# Patient Record
Sex: FEMALE | Race: WHITE | Employment: STUDENT | URBAN - METROPOLITAN AREA
[De-identification: names, ages, dates, MRNs, and addresses within clinical notes are randomized per-mention and may not be internally consistent; named-entity substitution may affect disease eponyms.]

---
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
## 2024-07-17 ENCOUNTER — OFFICE VISIT (OUTPATIENT)
Dept: FAMILY MEDICINE CLINIC | Facility: CLINIC | Age: 19
End: 2024-07-17
Payer: COMMERCIAL

## 2024-07-17 ENCOUNTER — TELEPHONE (OUTPATIENT)
Dept: FAMILY MEDICINE CLINIC | Facility: CLINIC | Age: 19
End: 2024-07-17

## 2024-07-17 VITALS
TEMPERATURE: 97 F | DIASTOLIC BLOOD PRESSURE: 77 MMHG | HEIGHT: 65 IN | RESPIRATION RATE: 16 BRPM | BODY MASS INDEX: 26.33 KG/M2 | SYSTOLIC BLOOD PRESSURE: 113 MMHG | WEIGHT: 158 LBS | HEART RATE: 96 BPM

## 2024-07-17 DIAGNOSIS — Z00.00 LABORATORY EXAMINATION ORDERED AS PART OF A ROUTINE GENERAL MEDICAL EXAMINATION: ICD-10-CM

## 2024-07-17 DIAGNOSIS — Z13.89 SCREENING FOR GENITOURINARY CONDITION: ICD-10-CM

## 2024-07-17 DIAGNOSIS — Z00.00 PHYSICAL EXAM, ANNUAL: Primary | ICD-10-CM

## 2024-07-17 PROCEDURE — 99385 PREV VISIT NEW AGE 18-39: CPT | Performed by: FAMILY MEDICINE

## 2024-07-17 RX ORDER — ALPRAZOLAM 0.25 MG/1
0.25 TABLET ORAL
COMMUNITY
Start: 2024-05-17 | End: 2024-07-17

## 2024-07-17 RX ORDER — FLUOXETINE HYDROCHLORIDE 20 MG/1
20 CAPSULE ORAL EVERY MORNING
COMMUNITY
Start: 2024-07-14

## 2024-07-17 NOTE — TELEPHONE ENCOUNTER
Patient's mother called inquiring re william's annual physical and why PAP smear was not done today. Dr. Soto requested that I call the patient and explain the \"why\" behind it. Spoke with patient's mother, in HIPAA form.Explained recommendation of [employer] Ob[employer] that PAP smear is performed at age 21 and above. She understood. Advised the patient's mother to call their insurance regarding coverage for PAP smear before age 21. Patient's mother said she will.

## 2024-07-17 NOTE — PATIENT INSTRUCTIONS
Healthy diet.  Stay active.   Return for fasting blood work.  You can call [phone] to have it scheduled.

## 2024-07-17 NOTE — PROGRESS NOTES
HPI:   Missy Stuart is a 18 year old female who presents for a complete physical exam. Symptoms: denies discharge, itching, burning or dysuria.  She is new patient today here to establish care.  Patient had left knee surgery for ACL tear after a skiing accident.  Patient is taking fluoxetine prescribed by Dr. Judy Sky psychiatrist for anxiety doing well.  Patient has continued to hearing loss which is stable right now moderate to severe seeing Estrellita Reyes.  Patient is going to school in [place].    Immunization History   Administered Date(s) Administered    Covid-19 Vaccine Pfizer 30 mcg/0.3 ml 04/07/2021, 05/01/2021, 01/05/2022    DTAP 10/18/2004, 12/10/2004, 02/07/2005, 11/07/2005, 09/02/2008    FLU VAC QIV SPLIT 3 YRS AND OLDER (23283) 08/30/2016, 09/28/2017, 09/18/2019    FLUMIST Intranasal Influenza 09/24/2013, 10/07/2014    FLUZONE 6 months and older PFS 0.5 ml (12946) 11/28/2018, 02/10/2021    HEP A 08/07/2006, 12/19/2007    HEP B/HIB 10/18/2004, 12/10/2004, 08/09/2005    Hpv Virus Vaccine 9 Dorinda Im 07/28/2015, 03/22/2016, 08/30/2016    IPV 10/18/2004, 12/10/2004, 11/07/2005, 09/02/2008    Influenza 12/19/2007, 09/01/2011, 10/23/2012    Influenza Virus Vaccine, H1N1 12/12/2009, 02/25/2010    MMR 08/09/2005, 08/10/2009    Meningococcal-Menactra 07/28/2015, 02/10/2021    Pneumococcal (Prevnar 7) 10/18/2004, 12/10/2004, 02/07/2005, 08/09/2005    TDAP 07/28/2015    Varicella 11/07/2005, 08/10/2009   Deferred Date(s) Deferred    Influenza Vaccine Refused 03/02/2022      Wt Readings from Last 6 Encounters:   07/17/24 158 lb (71.7 kg) (86%, Z= 1.07)*   06/20/23 140 lb (63.5 kg) (72%, Z= 0.59)*   11/25/22 140 lb (63.5 kg) (74%, Z= 0.65)*   06/07/22 145 lb (65.8 kg) (81%, Z= 0.87)*   04/15/22 145 lb (65.8 kg) (81%, Z= 0.88)*   03/24/22 145 lb (65.8 kg) (81%, Z= 0.88)*     * Growth percentiles are based on CDC (Girls, 2-20 Years) data.     Body mass index is 26.29 kg/m².     Total Cholesterol (mg/dL)    Date Value   07/28/2015 203     Cholesterol (mg/dL)   Date Value   09/28/2017 186 (H)     HDL Cholesterol (mg/dL)   Date Value   07/28/2015 41     Direct HDL (mg/dL)   Date Value   09/28/2017 65     LDL Cholesterol (mg/dL)   Date Value   07/28/2015 84     Calculated LDL (mg/dL)   Date Value   09/28/2017 89     AST (SGOT) (IU/L)   Date Value   04/26/2010 41     AST (U/L)   Date Value   09/28/2017 17     ALT (SGPT) (IU/L)   Date Value   04/26/2010 21     ALT (U/L)   Date Value   09/28/2017 15      Glucose   Date Value Ref Range Status   04/26/2010 91 65 - 99 mg/dL Final        Current Outpatient Medications   Medication Sig Dispense Refill    FLUoxetine 20 MG Oral Cap Take 1 capsule (20 mg total) by mouth every morning.        Past Medical History:    Hearing aid worn      Past Surgical History:   Procedure Laterality Date    Knee surgery Left 06/05/2024    ACL      Family History   Problem Relation Age of Onset    Other (Other) Mother         thyroid    Other (RA) Maternal Grandmother     Hypertension Maternal Grandmother     Hypertension Maternal Grandfather     Lipids Maternal Grandfather     Heart Disorder Paternal Grandfather     Lipids Paternal Grandfather       Social History:   Social History     Socioeconomic History    Marital status: Single   Tobacco Use    Smoking status: Never    Smokeless tobacco: Never   Vaping Use    Vaping status: Never Used   Substance and Sexual Activity    Alcohol use: No    Drug use: No    Sexual activity: Never     Occ: student. : no Children: none  Exercise: three times per week.  Diet: watches calories closely     REVIEW OF SYSTEMS:   GENERAL: feels well otherwise  SKIN: denies any unusual skin lesions  EYES:denies blurred vision or double vision  HEENT: denies nasal congestion, sinus pain or ST  LUNGS: denies shortness of breath with exertion  CARDIOVASCULAR: denies chest pain on exertion  GI: denies abdominal pain,denies heartburn  : denies dysuria, vaginal  discharge or itching,periods regular   MUSCULOSKELETAL: denies back pain  NEURO: denies headaches  PSYCHE: denies depression or anxiety  HEMATOLOGIC: denies hx of anemia  ENDOCRINE: denies thyroid history  ALL/ASTHMA: denies hx of allergy or asthma    EXAM:   /77 (BP Location: Left arm, Patient Position: Sitting, Cuff Size: adult)   Pulse 96   Temp 97.3 °F (36.3 °C) (Temporal)   Resp 16   Ht 5' 5\" (1.651 m)   Wt 158 lb (71.7 kg)   LMP 07/10/2024 (Exact Date)   BMI 26.29 kg/m²   Body mass index is 26.29 kg/m².   GENERAL: well developed, well nourished,in no apparent distress  SKIN: no rashes,no suspicious lesions  HEENT: atraumatic, normocephalic,ears and throat are clear  EYES:PERRLA, EOMI, normal optic disk,conjunctiva are clear  NECK: supple,no adenopathy,no bruits  CHEST: no chest tenderness  BREAST: no dominant or suspicious mass  LUNGS: clear to auscultation  CARDIO: RRR without murmur  GI: good BS's,no masses, HSM or tenderness  :introitus is normal,scant discharge,cervix is pink,no adnexal masses or tenderness, PAP was done   RECTAL:good rectal tone, stool is OB negative  MUSCULOSKELETAL: back is not tender,FROM of the back  EXTREMITIES: no cyanosis, clubbing or edema  NEURO: Oriented times three,cranial nerves are intact,motor and sensory are grossly intact    ASSESSMENT AND PLAN:   Missy Stuart is a 18 year old female who presents for a complete physical exam.  Encounter Diagnoses   Name Primary?    Physical exam, annual Yes    Laboratory examination ordered as part of a routine general medical examination     Screening for genitourinary condition        Orders Placed This Encounter   Procedures    CBC With Differential With Platelet    Comp Metabolic Panel (14)    Lipid Panel    Urinalysis with Culture Reflex    TSH W Reflex To Free T4       Meds & Refills for this Visit:  Requested Prescriptions      No prescriptions requested or ordered in this encounter   Healthy diet.  Stay active.    Fasting blood work.    Imaging & Consults:  None    Pap and pelvic deferred.  Self breast exam explained. Health maintenance, will check fasting Lipids, CMP, and CBC. Pt be at 44 referred for screening colonoscopy. Pt' s weight is Body mass index is 26.29 kg/m²., recommended low carb diet and aerobic exercise 30 minutes three times weekly.  The patient indicates understanding of these issues and agrees to the plan.  The patient is asked to return for CPX in 1 year follow-up sooner depending on the test results.

## 2024-08-14 ENCOUNTER — PATIENT MESSAGE (OUTPATIENT)
Facility: CLINIC | Age: 19
End: 2024-08-14

## 2024-08-14 ENCOUNTER — HOSPITAL ENCOUNTER (OUTPATIENT)
Dept: CT IMAGING | Facility: HOSPITAL | Age: 19
Discharge: HOME | End: 2024-08-14
Attending: EMERGENCY MEDICINE
Payer: COMMERCIAL

## 2024-08-14 ENCOUNTER — OFFICE VISIT (OUTPATIENT)
Facility: CLINIC | Age: 19
End: 2024-08-14
Payer: COMMERCIAL

## 2024-08-14 ENCOUNTER — LAB ENCOUNTER (OUTPATIENT)
Dept: LAB | Age: 19
End: 2024-08-14
Attending: Other
Payer: COMMERCIAL

## 2024-08-14 ENCOUNTER — HOSPITAL ENCOUNTER (EMERGENCY)
Facility: HOSPITAL | Age: 19
Discharge: HOME | End: 2024-08-14
Attending: EMERGENCY MEDICINE
Payer: COMMERCIAL

## 2024-08-14 VITALS
DIASTOLIC BLOOD PRESSURE: 79 MMHG | HEART RATE: 84 BPM | OXYGEN SATURATION: 99 % | SYSTOLIC BLOOD PRESSURE: 115 MMHG | RESPIRATION RATE: 16 BRPM

## 2024-08-14 VITALS — RESPIRATION RATE: 16 BRPM | HEART RATE: 86 BPM | SYSTOLIC BLOOD PRESSURE: 118 MMHG | DIASTOLIC BLOOD PRESSURE: 72 MMHG

## 2024-08-14 DIAGNOSIS — G40.109 FOCAL EPILEPSY (HCC): Primary | ICD-10-CM

## 2024-08-14 DIAGNOSIS — R79.89 LOW SERUM SODIUM: ICD-10-CM

## 2024-08-14 DIAGNOSIS — D72.829 LEUKOCYTOSIS, UNSPECIFIED TYPE: Primary | ICD-10-CM

## 2024-08-14 DIAGNOSIS — Z00.00 PHYSICAL EXAM, ANNUAL: ICD-10-CM

## 2024-08-14 DIAGNOSIS — Z79.899 ENCOUNTER FOR LONG-TERM (CURRENT) DRUG USE: ICD-10-CM

## 2024-08-14 DIAGNOSIS — R56.9 SEIZURE (HCC): Primary | ICD-10-CM

## 2024-08-14 DIAGNOSIS — E87.6 LOW SERUM POTASSIUM: Primary | ICD-10-CM

## 2024-08-14 DIAGNOSIS — Z13.89 SCREENING FOR GENITOURINARY CONDITION: ICD-10-CM

## 2024-08-14 LAB
ALBUMIN SERPL-MCNC: 5.1 G/DL (ref 3.2–4.8)
ALBUMIN/GLOB SERPL: 2 {RATIO} (ref 1–2)
ALP LIVER SERPL-CCNC: 106 U/L
ALT SERPL-CCNC: 22 U/L
ANION GAP SERPL CALC-SCNC: 16.5 MMOL/L (ref 0–18)
ANION GAP SERPL CALC-SCNC: 9 MMOL/L (ref 0–18)
AST SERPL-CCNC: 31 U/L (ref ?–34)
ATRIAL RATE: 109 BPM
B-HCG SERPL-ACNC: <1 MIU/ML
BASOPHILS # BLD AUTO: 0.04 X10(3) UL (ref 0–0.2)
BASOPHILS # BLD AUTO: 0.1 X10(3) UL (ref 0–0.2)
BASOPHILS NFR BLD AUTO: 0.4 %
BASOPHILS NFR BLD AUTO: 0.5 %
BILIRUB SERPL-MCNC: 0.6 MG/DL (ref 0.3–1.2)
BUN BLD-MCNC: 11 MG/DL (ref 9–23)
BUN BLD-MCNC: 13 MG/DL (ref 9–23)
BUN/CREAT SERPL: 10.7 (ref 10–20)
CALCIUM BLD-MCNC: 9.8 MG/DL (ref 8.7–10.4)
CALCIUM BLD-MCNC: 9.8 MG/DL (ref 8.7–10.4)
CHLORIDE SERPL-SCNC: 102 MMOL/L (ref 98–112)
CHLORIDE SERPL-SCNC: 107 MMOL/L (ref 98–112)
CHOLEST SERPL-MCNC: 229 MG/DL (ref ?–200)
CO2 SERPL-SCNC: 14.5 MMOL/L (ref 21–32)
CO2 SERPL-SCNC: 22 MMOL/L (ref 21–32)
CREAT BLD-MCNC: 0.95 MG/DL
CREAT BLD-MCNC: 1.03 MG/DL
DEPRECATED RDW RBC AUTO: 41 FL (ref 35.1–46.3)
EGFRCR SERPLBLD CKD-EPI 2021: 80 ML/MIN/1.73M2 (ref 60–?)
EGFRCR SERPLBLD CKD-EPI 2021: 88 ML/MIN/1.73M2 (ref 60–?)
EOSINOPHIL # BLD AUTO: 0.3 X10(3) UL (ref 0–0.7)
EOSINOPHIL # BLD AUTO: 0.68 X10(3) UL (ref 0–0.7)
EOSINOPHIL NFR BLD AUTO: 2.7 %
EOSINOPHIL NFR BLD AUTO: 3.7 %
ERYTHROCYTE [DISTWIDTH] IN BLOOD BY AUTOMATED COUNT: 12.5 %
ERYTHROCYTE [DISTWIDTH] IN BLOOD BY AUTOMATED COUNT: 12.6 % (ref 11–15)
FASTING STATUS PATIENT QL REPORTED: NO
GLOBULIN PLAS-MCNC: 2.6 G/DL (ref 2–3.5)
GLUCOSE BLD-MCNC: 202 MG/DL (ref 70–99)
GLUCOSE BLD-MCNC: 89 MG/DL (ref 70–99)
HCT VFR BLD AUTO: 40.6 %
HCT VFR BLD AUTO: 41 %
HDLC SERPL-MCNC: 87 MG/DL (ref 40–59)
HGB BLD-MCNC: 13.7 G/DL
HGB BLD-MCNC: 13.9 G/DL
IMM GRANULOCYTES # BLD AUTO: 0.06 X10(3) UL (ref 0–1)
IMM GRANULOCYTES # BLD AUTO: 0.16 X10(3) UL (ref 0–1)
IMM GRANULOCYTES NFR BLD: 0.5 %
IMM GRANULOCYTES NFR BLD: 0.9 %
LDLC SERPL CALC-MCNC: 118 MG/DL (ref ?–100)
LYMPHOCYTES # BLD AUTO: 1.4 X10(3) UL (ref 1–4)
LYMPHOCYTES # BLD AUTO: 4.44 X10(3) UL (ref 1–4)
LYMPHOCYTES NFR BLD AUTO: 12.4 %
LYMPHOCYTES NFR BLD AUTO: 24 %
MCH RBC QN AUTO: 29.5 PG (ref 26–34)
MCH RBC QN AUTO: 29.8 PG (ref 26–34)
MCHC RBC AUTO-ENTMCNC: 33.4 G/DL (ref 31–37)
MCHC RBC AUTO-ENTMCNC: 34.2 G/DL (ref 31–37)
MCV RBC AUTO: 87.1 FL
MCV RBC AUTO: 88.4 FL
MONOCYTES # BLD AUTO: 1.37 X10(3) UL (ref 0.1–1)
MONOCYTES # BLD AUTO: 1.57 X10(3) UL (ref 0.1–1)
MONOCYTES NFR BLD AUTO: 12.1 %
MONOCYTES NFR BLD AUTO: 8.5 %
NEUTROPHILS # BLD AUTO: 11.58 X10 (3) UL (ref 1.5–7.7)
NEUTROPHILS # BLD AUTO: 11.58 X10(3) UL (ref 1.5–7.7)
NEUTROPHILS # BLD AUTO: 8.15 X10 (3) UL (ref 1.5–7.7)
NEUTROPHILS # BLD AUTO: 8.15 X10(3) UL (ref 1.5–7.7)
NEUTROPHILS NFR BLD AUTO: 62.4 %
NEUTROPHILS NFR BLD AUTO: 71.9 %
NONHDLC SERPL-MCNC: 142 MG/DL (ref ?–130)
OSMOLALITY SERPL CALC.SUM OF ELEC: 282 MOSM/KG (ref 275–295)
OSMOLALITY SERPL CALC.SUM OF ELEC: 285 MOSM/KG (ref 275–295)
P AXIS: 47 DEGREES
P-R INTERVAL: 152 MS
PLATELET # BLD AUTO: 293 10(3)UL (ref 150–450)
PLATELET # BLD AUTO: 393 10(3)UL (ref 150–450)
PLATELETS.RETICULATED NFR BLD AUTO: 1.3 % (ref 0–7)
POTASSIUM SERPL-SCNC: 3.2 MMOL/L (ref 3.5–5.1)
POTASSIUM SERPL-SCNC: 3.7 MMOL/L (ref 3.5–5.1)
PROT SERPL-MCNC: 7.7 G/DL (ref 5.7–8.2)
Q-T INTERVAL: 358 MS
QRS DURATION: 86 MS
QTC CALCULATION (BEZET): 482 MS
R AXIS: 45 DEGREES
RBC # BLD AUTO: 4.64 X10(6)UL
RBC # BLD AUTO: 4.66 X10(6)UL
SODIUM SERPL-SCNC: 133 MMOL/L (ref 136–145)
SODIUM SERPL-SCNC: 138 MMOL/L (ref 136–145)
T AXIS: 45 DEGREES
TRIGL SERPL-MCNC: 139 MG/DL (ref 30–149)
TSI SER-ACNC: 1.93 MIU/ML (ref 0.55–4.78)
VENTRICULAR RATE: 109 BPM
VLDLC SERPL CALC-MCNC: 24 MG/DL (ref 0–30)
WBC # BLD AUTO: 11.3 X10(3) UL (ref 4–11)
WBC # BLD AUTO: 18.5 X10(3) UL (ref 4–11)

## 2024-08-14 PROCEDURE — 99285 EMERGENCY DEPT VISIT HI MDM: CPT

## 2024-08-14 PROCEDURE — 36415 COLL VENOUS BLD VENIPUNCTURE: CPT

## 2024-08-14 PROCEDURE — 80053 COMPREHEN METABOLIC PANEL: CPT

## 2024-08-14 PROCEDURE — 99284 EMERGENCY DEPT VISIT MOD MDM: CPT

## 2024-08-14 PROCEDURE — 85025 COMPLETE CBC W/AUTO DIFF WBC: CPT

## 2024-08-14 PROCEDURE — 93005 ELECTROCARDIOGRAM TRACING: CPT

## 2024-08-14 PROCEDURE — 84702 CHORIONIC GONADOTROPIN TEST: CPT | Performed by: EMERGENCY MEDICINE

## 2024-08-14 PROCEDURE — 84443 ASSAY THYROID STIM HORMONE: CPT

## 2024-08-14 PROCEDURE — 80061 LIPID PANEL: CPT

## 2024-08-14 PROCEDURE — 70450 CT HEAD/BRAIN W/O DYE: CPT | Performed by: EMERGENCY MEDICINE

## 2024-08-14 PROCEDURE — 99205 OFFICE O/P NEW HI 60 MIN: CPT | Performed by: OTHER

## 2024-08-14 PROCEDURE — 80048 BASIC METABOLIC PNL TOTAL CA: CPT

## 2024-08-14 RX ORDER — MIRTAZAPINE 7.5 MG/1
7.5 TABLET, FILM COATED ORAL NIGHTLY
COMMUNITY

## 2024-08-14 RX ORDER — MIDAZOLAM 5 MG/.1ML
5 SPRAY NASAL AS NEEDED
Qty: 1 EACH | Refills: 0 | Status: SHIPPED | OUTPATIENT
Start: 2024-08-14

## 2024-08-14 RX ORDER — ACETAMINOPHEN 500 MG
1000 TABLET ORAL ONCE
Status: COMPLETED | OUTPATIENT
Start: 2024-08-14 | End: 2024-08-14

## 2024-08-14 RX ORDER — OXCARBAZEPINE 150 MG/1
TABLET, FILM COATED ORAL
Qty: 180 TABLET | Refills: 11 | Status: SHIPPED | OUTPATIENT
Start: 2024-08-14

## 2024-08-14 RX ORDER — CLONAZEPAM 0.25 MG/1
0.25 TABLET, ORALLY DISINTEGRATING ORAL 2 TIMES DAILY
Qty: 10 TABLET | Refills: 0 | Status: SHIPPED | OUTPATIENT
Start: 2024-08-14 | End: 2024-08-19

## 2024-08-14 NOTE — ED PROVIDER NOTES
Patient Seen in: [facility] Emergency Department      History   No chief complaint on file.    Stated Complaint: SEIZURE    Subjective:   HPI    19-year-old female presenting with a seizure.  Patient first-time seizure.  Approximately 5 minutes with a prolonged postictal state that the EMS arrived gave Versed 2.  Patient has had no recent illness cough cold runny nose recently has been without her fluoxetine for anxiety she has been taking for  She denies any other complaints.    Objective:   No pertinent past medical history.            No pertinent past surgical history.              No pertinent social history.            Review of Systems    Positive for stated Chief Complaint: No chief complaint on file.    Other systems are as noted in HPI.  Constitutional and vital signs reviewed.      All other systems reviewed and negative except as noted above.    Physical Exam     ED Triage Vitals   BP    Pulse    Resp    Temp    Temp src    SpO2    O2 Device        Current Vitals:   No data recorded        Physical Exam    Patient sleeping small abrasion noted to the patient's son otherwise HEENT exam is normal lungs are clear cardiovascular exam regular rhythm abdomen soft nontender extremities no Cyanosis or edema no rash    ED Course     Labs Reviewed   CBC WITH DIFFERENTIAL WITH PLATELET - Abnormal; Notable for the following components:       Result Value    WBC 18.5 (*)     Neutrophil Absolute Prelim 11.58 (*)     Neutrophil Absolute 11.58 (*)     Lymphocyte Absolute 4.44 (*)     Monocyte Absolute 1.57 (*)     All other components within normal limits   COMP METABOLIC PANEL (14)   LIPID PANEL   TSH W REFLEX TO FREE T4   URINALYSIS WITH CULTURE REFLEX             Differential diagnosis includes status epilepticus, seizure         MDM                                         Medical Decision Making  19-year-old female presenting with seizure.  IV established cardiac monitor shows a sinus rhythm pulse ox shows no  signs of hypoxia CBC shows an elevated white cell count metabolic panel shows a slightly elevated glucose level but could be related to seizure independent interpretation by ED physician of head CT shows no acute hemorrhage patient is awake alert and appropriate.  Emerged part will be discharged home has Prozac at home to use is to follow-up with neurology is to stop driving until cleared by neurology and is to return to the emergency department for worsening symptoms other complaints  The patient was screened and evaluated during this visit.  As a treating physician attending to the patient, I determined, within reasonable clinical confidence and prior to discharge, that an emergency medical condition was not or was no longer present.  There was no indication for further evaluation, treatment or admission on an emergency basis.    The usual and customary discharge instructions were discussed given the patient's ER course.  We discussed signs and symptoms that should prompt the patient's immediate return to the emergency department.  Reasonable over-the-counter and prescription treatment options and physician follow-up plan was discussed.  Patient was discharged home in good condition  This note was prepared using Dragon Medical voice recognition dictation software.  As a result errors may occur.  When identified to these areas have been corrected.  While every attempt is made to correct errors during dictation discrepancies may still exist.  Please contact if there are any errors    Amount and/or Complexity of Data Reviewed  Labs: ordered. Decision-making details documented in ED Course.  Radiology: ordered and independent interpretation performed. Decision-making details documented in ED Course.  ECG/medicine tests: ordered and independent interpretation performed. Decision-making details documented in ED Course.        Disposition and Plan     Clinical Impression:  1. Physical exam, annual    2. Screening for  genitourinary condition         Disposition:  There is no disposition on file for this visit.  There is no disposition time on file for this visit.    Follow-up:  No follow-up provider specified.        Medications Prescribed:  Current Discharge Medication List

## 2024-08-14 NOTE — PROGRESS NOTES
Neurology History & Physical     ASSESSMENT & PLAN:      ICD-10-CM    1. Focal epilepsy (HCC)  G40.109 EEG     MRI BRAIN (W+WO) (CPT=70553)     Midazolam (NAYZILAM) 5 MG/0.1ML Nasal Solution      2. Encounter for long-term (current) drug use  Z79.899 Basic Metabolic Panel (8)     CBC With Differential With Platelet        New onset epilepsy, with focal seizures.  She had daily greer vu leading up to the GTC seizure today, and thus needs to be on medication immediately.  This poses threat to bodily function.  Obtain MRI brain and EEG.  Start OXC titration (avoiding LEV given psych history), but first check CBC and BMP prior to starting OXC, given they were abnormal in ER.  Another option would be LCM.  I don't think her psych meds are related to the seizures.    She was concerned about her EKG and worsening of her left knee pain after the seizure, I advised she d/w PCP.    Counseling was provided: until seizure-free / event-free for 6 consecutive months, the patient must take precautions, including but not limited to absolutely avoid driving; avoid the use of heavy machinery; avoid alcohol and drug use; to take usual precautions, including but not limited to swimming only with supervision, avoiding tub baths, using only the back burners on the stove, avoiding open flames/heights/rooftops; taking precautions with children and infants; and to be aware of risks and benefits of treatment, including but not limited to medication side-effects, including possibly life-threatening side effects and conditions.    We discussed seizure rescue plan, and I provided Nayzilam Rx, to be given if shaking lasts > 5 minutes.  I recommended to call ambulance if: shaking lasts > 5 minutes, if there is a reduction or cessation of breathing, the patient turns blue, or any other concern out of the ordinary for the patient's seizures.  I advised that EMS can also give rescue medication, and every seizure does not require ER visit, especially  if it is brief or within the usual pattern for the patient's seizures.    Return in about 6 weeks (around 9/25/2024).       ~~~~~~~~~~~~~~~~~~~~~~~~~~~    CHIEF COMPLAINT / REASON FOR VISIT:    Chief Complaint   Patient presents with    Seizures     Was in ER this AM. Had auras a week leading up to episode (greer vu, feeling hot, numbness in hands). Also had episode of leg shaking 2 weeks ago. Medication changes recently. Accompanied by mother & father     HISTORY OBTAINED FROM:  Patient and others as above  Chart review    HISTORY:  Missy Stuart is a 19 year old female with new events of very intense greer vu for the past week.  There is BUE numbness and sweaty, increasing frequency each day (had 7 yesterday).  Today at 1 am, she was with her mom and brother, and had another greer vu feeling, more intense and prolonged.  She had a bad taste in her mouth.  She felt the numbness, was washing her hands and face due to symptoms.  She could not get her words out, and then she began to spin in a [place].  Her mom hugged her, her head jerked upward and she had a generalized convulsion, foaming at the mouth.  EMS was called.  Shaking stopped in 4-5 minutes.  Was taken to ER here and evaluated.    Has been on prozac since May 2024, and remeron since July 2024.  She forgot to take both for 3 days (Fri-Sat-Sun).  On these for anxiety and insomnia.    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)    Epilepsy risk factors:  Normal birth and development   No febrile or childhood seizures   No meningitis/encephalitis  No head trauma with prolonged LOC/amnesia (did have a concussion at age 6 yrs, no LOC)  No known structural brain lesions  One cousin had a seizure    Previous medication trials:  none    DATA REVIEWED:  As documented in the history    Aug 2024  Head CT unremarkable (I independently analyzed and interpreted this, and I agree with the  reading physician's report.)  ER note  CBC WBC 18.5  CMP Na 133, K 3.2, CO2 14.5    PHYSICAL EXAMINATION:  /72   Pulse 86   Resp 16   LMP 07/10/2024 (Exact Date)     Gen: in NAD  +Right lateral tongue bite  MSE: AAOx3, nl language, nl attn/conc, nl fund of knowledge  CN: PERRL, VFF; EOMI, no nystagmus; nl facial mvmt bilaterally; nl hearing bilaterally; nl palate elevation bilaterally; nl voice; nl shoulder shrug b/I; nl tongue movement  Motor: 5/5 x4; no drift; normal tone; no abnormal movements  Sensory: nl vibration x4  Coord: nl FTN b/I  Reflex: 2+ BUE and BLE  Gait: normal    No Known Allergies    Current medications:   mirtazapine 7.5 MG Oral Tab Take 1 tablet (7.5 mg total) by mouth nightly.      Midazolam (NAYZILAM) 5 MG/0.1ML Nasal Solution 5 mg by Nasal route as needed (seizure with shaking greater than 5 minutes). 1 each 0    OXcarbazepine 150 MG Oral Tab One tab twice a day x 5 days, then two tabs twice a day x 5 days, then 3 tabs twice a day x 5 days 180 tablet 11       Past Medical History:    Anxiety    Hearing aid worn       Past Surgical History:   Procedure Laterality Date    Knee surgery Left 06/05/2024    ACL       Social History     Socioeconomic History    Marital status: Single   Tobacco Use    Smoking status: Never    Smokeless tobacco: Never   Vaping Use    Vaping status: Never Used   Substance and Sexual Activity    Alcohol use: No    Drug use: No    Sexual activity: Never   Other Topics Concern    Caffeine Concern Yes     Comment: 1-2 per day    Exercise No       Family History   Problem Relation Age of Onset    Other (Other) Mother         thyroid    Other (RA) Maternal Grandmother     Hypertension Maternal Grandmother     Hypertension Maternal Grandfather     Lipids Maternal Grandfather     Heart Disorder Paternal Grandfather     Lipids Paternal Grandfather          Home Robbins MD, FAES, FAAN  Board-Certified in Neurology, Epilepsy, and Clinical Neurophysiology  [facility]

## 2024-08-14 NOTE — PATIENT INSTRUCTIONS
After your visit at the [facility] office  today,  please direct any follow up questions or medication needs to the staff in our  [place] office so that your concerns may be promptly addressed.  We are available through [employer] or at the numbers below:    The phone number is:   [phone] option #1    The fax number is:  [phone]    Your pharmacy should also send any requests electronically to the [place] office.    Refill policies:    Allow 2-3 business days for refills; controlled substances may take longer.  Contact your pharmacy at least 5 days prior to running out of medication and have them send an electronic request or submit request through the “request refill” option in your [employer] account.  Refills are not addressed on weekends; covering physicians do not authorize routine medications on weekends.  No narcotics or controlled substances are refilled after noon on Fridays or by on call physicians.  By law, narcotics must be electronically prescribed.  A 30 day supply with no refills is the maximum allowed.  If your prescription is due for a refill, you may be due for a follow up appointment.  To best provide you care, patients receiving routine medications need to be seen at least once a year.  Patients receiving narcotic/controlled substance medications need to be seen at least once every 3 months.  In the event that your preferred pharmacy does not have the requested medication in stock (e.g. Backordered), it is your responsibility to find another pharmacy that has the requested medication available.  We will gladly send a new prescription to that pharmacy at your request.    Scheduling Tests:    If your physician has ordered radiology tests such as MRI or CT scans, please contact Central Scheduling at [phone] right away to schedule the test.  Once scheduled, the [facility] Centralized Referral Team will work with your insurance carrier to obtain pre-certification or prior authorization.   Depending on your insurance carrier, approval may take 3-10 days.  It is highly recommended patients assure they have received an authorization before having a test performed.  If test is done without insurance authorization, patient may be responsible for the entire amount billed.      Precertification and Prior Authorizations:  If your physician has recommended that you have a procedure or additional testing performed the [facility] Centralized Referral Team will contact your insurance carrier to obtain pre-certification or prior authorization.    You are strongly encouraged to contact your insurance carrier to verify that your procedure/test has been approved and is a COVERED benefit.  Although the [facility] Centralized Referral Team does its due diligence, the insurance carrier gives the disclaimer that \"Although the procedure is authorized, this does not guarantee payment.\"    Ultimately the patient is responsible for payment.   Thank you for your understanding in this matter.  Paperwork Completion:  If you require FMLA or disability paperwork for your recovery, please make sure to either drop it off or have it faxed to our office at [phone]. Be sure the form has your name and date of birth on it.  The form will be faxed to our Forms Department and they will complete it for you.  There is a 25$ fee for all forms that need to be filled out.  Please be aware there is a 10-14 day turnaround time.  You will need to sign a release of information (PHILIPPE) form if your paperwork does not come with one.  You may call the Forms Department with any questions at [phone].  Their fax number is [phone].

## 2024-08-15 ENCOUNTER — TELEPHONE (OUTPATIENT)
Dept: FAMILY MEDICINE CLINIC | Facility: CLINIC | Age: 19
End: 2024-08-15

## 2024-08-15 ENCOUNTER — TELEPHONE (OUTPATIENT)
Dept: NEUROLOGY | Facility: CLINIC | Age: 19
End: 2024-08-15

## 2024-08-15 NOTE — TELEPHONE ENCOUNTER
Patient mother stated her daughter has a mri appt. And would like a order faxed to her insurance to get the insurance approved faster so her daughter can go to her mri appt. And would like a call back would like the letter stating a urgent so it can be approved.

## 2024-08-15 NOTE — TELEPHONE ENCOUNTER
Spoke with mom and dad, advised not to start OXC since labs not resulted yet.  Will start CZP 0.25 mg BID x 5 days in the meantime.  Side effects discussed.

## 2024-08-15 NOTE — TELEPHONE ENCOUNTER
Patient mother calling to request appointment today for hospital follow up. Patient had grand mal seizure and cardiology results. Would also like to speak to a nurse.

## 2024-08-16 ENCOUNTER — PATIENT MESSAGE (OUTPATIENT)
Dept: ADMINISTRATIVE | Age: 19
End: 2024-08-16

## 2024-08-16 NOTE — TELEPHONE ENCOUNTER
We just had a cancellation today at 2 PM if she would like to be seen today I can see her at 2 pm.  Thank you

## 2024-08-16 NOTE — TELEPHONE ENCOUNTER
Please put patient on our cancellation list she will be notified if we have any cancellation for the next week.  She can also call our office next week to check we have cancellation.  Thank you

## 2024-08-16 NOTE — TELEPHONE ENCOUNTER
"[employer]  online to initiate authorization    MRI BRAIN (W+WO) (CPT=70553)        Referral #: [id number]      Scheduled For: 08/19/2024    Status: pending authorization     reference Case Number: [id number]      Clinical notes sent for review.     Patient notified of pending status via [employer].     Appt Desk > Noted

## 2024-08-16 NOTE — TELEPHONE ENCOUNTER
I spoke to the patients mother. Missy recently had a grand mal seizure. She has a new diagnosis of a seizure disorder. She has an appointment with neuro, Dr. Robbins on 09/12 and with cardiology on 08/29. I made her an appointment to follow up with you on 09/11/24. Patients mother prefers she is seen sooner if possible. She asked for one of the following dates if any possible availability.   08/20 or 08/31. Please advise.

## 2024-08-19 ENCOUNTER — HOSPITAL ENCOUNTER (OUTPATIENT)
Dept: MRI IMAGING | Facility: HOSPITAL | Age: 19
Discharge: HOME | End: 2024-08-19
Attending: Other
Payer: COMMERCIAL

## 2024-08-19 ENCOUNTER — TELEPHONE (OUTPATIENT)
Dept: NEUROLOGY | Facility: CLINIC | Age: 19
End: 2024-08-19

## 2024-08-19 DIAGNOSIS — Z79.899 ENCOUNTER FOR LONG-TERM (CURRENT) DRUG USE: ICD-10-CM

## 2024-08-19 DIAGNOSIS — G40.109 FOCAL EPILEPSY (HCC): Primary | ICD-10-CM

## 2024-08-19 DIAGNOSIS — G40.109 FOCAL EPILEPSY (HCC): ICD-10-CM

## 2024-08-19 PROCEDURE — A9575 INJ GADOTERATE MEGLUMI 0.1ML: HCPCS

## 2024-08-19 PROCEDURE — 70553 MRI BRAIN STEM W/O & W/DYE: CPT | Performed by: OTHER

## 2024-08-19 RX ORDER — GADOTERATE MEGLUMINE 376.9 MG/ML
15 INJECTION INTRAVENOUS
Status: COMPLETED | OUTPATIENT
Start: 2024-08-19 | End: 2024-08-19

## 2024-08-19 RX ADMIN — GADOTERATE MEGLUMINE 14 ML: 376.9 INJECTION INTRAVENOUS at 09:48:00

## 2024-08-19 NOTE — TELEPHONE ENCOUNTER
Received prior authorization request for Nayzilam 5mg/0.1ml solution from [employer]. PA initiated through Cover My Meds. Key:[id number]. Pending approval.

## 2024-08-19 NOTE — TELEPHONE ENCOUNTER
Spoke with patient's mother and advised her to try to use Co-Pay assistance card. While waiting to hear about approval or denial. Verbalized understanding.     ALEXUS did receive denial of Nayzilam 5 mg nasal spray.

## 2024-08-19 NOTE — TELEPHONE ENCOUNTER
Received denial of Nayzilam Nasal [place] from [employer].    Per the denial,coverage is provided for:    The diagnosis of intermittent episodes of frequent seizure activity Seizure clusters, acute repetitive seizures).      ID request number [id number]  Pt ID [id number]    Appeal can be made to;    [employer] [facility] [id number]  [address] [place] [address]    Fax number [phone]    Called mother of pt (ok per HIPAA) and notified of above. Did advise will update on providers recommendations once determine.     Advised could try to  with co-pay card if they would like to try medication.  Pt's mother will update office as to if she is able to  with copay card.

## 2024-08-20 ENCOUNTER — OFFICE VISIT (OUTPATIENT)
Dept: NEUROLOGY | Facility: CLINIC | Age: 19
End: 2024-08-20
Payer: COMMERCIAL

## 2024-08-20 VITALS
BODY MASS INDEX: 26.66 KG/M2 | RESPIRATION RATE: 16 BRPM | HEART RATE: 98 BPM | SYSTOLIC BLOOD PRESSURE: 112 MMHG | WEIGHT: 160 LBS | DIASTOLIC BLOOD PRESSURE: 80 MMHG | OXYGEN SATURATION: 98 % | HEIGHT: 65 IN

## 2024-08-20 DIAGNOSIS — G40.109 FOCAL EPILEPSY (HCC): Primary | ICD-10-CM

## 2024-08-20 DIAGNOSIS — G40.909 ACUTE REPETITIVE SEIZURE (HCC): ICD-10-CM

## 2024-08-20 PROCEDURE — 99215 OFFICE O/P EST HI 40 MIN: CPT | Performed by: OTHER

## 2024-08-20 RX ORDER — MIDAZOLAM 5 MG/.1ML
5 SPRAY NASAL AS NEEDED
Qty: 1 EACH | Refills: 0 | Status: SHIPPED | OUTPATIENT
Start: 2024-08-20

## 2024-08-20 NOTE — PROGRESS NOTES
Neurology History & Physical     ASSESSMENT & PLAN:      ICD-10-CM    1. Focal epilepsy (HCC)  G40.109       2. Acute repetitive seizure (HCC)  G40.909 Midazolam (NAYZILAM) 5 MG/0.1ML Nasal Solution        New onset epilepsy, with focal seizures, uncontrolled.  She had daily greer vu leading up to the GTC seizure on 8/14/24.  This poses threat to bodily function.  On OXC now, avoiding LEV given psych history, could consider LCM if needed.  MRI unremarkable.  EEG pending.    I advised I don't have a strict restriction on her living and studying at college in [place].  However, her seizure medication is not yet likely therapeutic, and we won't know its effectiveness without more time passing, or without a blood level of OXC after being on a good dose for about 2 weeks (about 1-2 months on medication, though that is subject to debate).  I wouldn't recommend flying or a long road trip (e.g., to [place]).    We discussed that she should avoid EtOH and drug use, she asked about THC and I advised that I don't have a specific recommendation for or against recreational use, and I do not advise it as a prescription or a personally obtained use as part of epilepsy treatment.    We discussed seizure rescue plan, and I provided Nayzilam Rx, as she has risk for acute repetitive seizures (and likely had them the day prior to the GTCS), to be given if shaking lasts > 5 minutes.  We discussed SUDEP.    This was again a stressful time for her and her family, she declined to have a private discussion with me, at the next visit will discuss Women's issues at the next visit.      Return in about 1 month (around 9/20/2024).       ~~~~~~~~~~~~~~~~~~~~~~~~~~~    CHIEF COMPLAINT / REASON FOR VISIT:    Chief Complaint   Patient presents with    Neurologic Problem     Patient is here today with her parents (mom/dad)  Patient is here today to discuss MRI results also to see if it is okay for here to go back to school.        HISTORY OBTAINED  FROM:  Patient and others as above  Chart review    HISTORY:  Missy Stuart is a 19 year old female with new events of very intense greer vu for the past week.  There is BUE numbness and sweaty, increasing frequency each day (had 7 yesterday).  Today at 1 am, she was with her mom and brother, and had another greer vu feeling, more intense and prolonged.  She had a bad taste in her mouth.  She felt the numbness, was washing her hands and face due to symptoms.  She could not get her words out, and then she began to spin in a [place].  Her mom hugged her, her head jerked upward and she had a generalized convulsion, foaming at the mouth.  EMS was called.  Shaking stopped in 4-5 minutes.  Was taken to ER here and evaluated.    Has been on prozac since May 2024, and remeron since July 2024.  She forgot to take both for 3 days (Fri-Sat-Sun).  On these for anxiety and insomnia.    INTERIM HISTORY:  She finished CZP, it made her tired.  No further seizures or greer vu events.  Feeling tired but improving since last week.  She is on  mg BID, planning to start 300 mg BID tonight.    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)    Epilepsy risk factors:  Normal birth and development   No febrile or childhood seizures   No meningitis/encephalitis  No head trauma with prolonged LOC/amnesia (did have a concussion at age 6 yrs, no LOC)  No known structural brain lesions  One cousin had a seizure    Previous medication trials:  none    DATA REVIEWED:  As documented in the history    Aug 2024  CBC, CMP unremarkable  MRI brain unremarkable (arachnoid cyst is not significant)    Head CT unremarkable (I independently analyzed and interpreted this, and I agree with the reading physician's report.)  ER note  CBC WBC 18.5  CMP Na 133, K 3.2, CO2 14.5    PHYSICAL EXAMINATION:  /80   Pulse 98   Resp 16   Ht 65\"   Wt 160 lb (72.6 kg)   LMP  07/10/2024 (Exact Date)   SpO2 98%   BMI 26.63 kg/m²     Gen: in NAD  MSE: nl attn/conc, nl language, nl fund of knowledge  CN: EOMI, VFF, nl facial mvmt, nl palate, nl tongue  Motor: 5/5 x4  DTR: 2+ BUE and BLE  Coord: nl FTN b/I  Gait: normal    No Known Allergies    Current medications:   Midazolam (NAYZILAM) 5 MG/0.1ML Nasal Solution 5 mg by Nasal route as needed (seizure with shaking greater than 5 minutes). 1 each 0    OXcarbazepine 150 MG Oral Tab One tab twice a day x 5 days, then two tabs twice a day x 5 days, then 3 tabs twice a day x 5 days 180 tablet 11    FLUoxetine 20 MG Oral Cap Take 1 capsule (20 mg total) by mouth every morning.         Past Medical History:    Anxiety    Hearing aid worn       Past Surgical History:   Procedure Laterality Date    Knee surgery Left 06/05/2024    ACL       Social History     Socioeconomic History    Marital status: Single   Tobacco Use    Smoking status: Never    Smokeless tobacco: Never   Vaping Use    Vaping status: Never Used   Substance and Sexual Activity    Alcohol use: No    Drug use: No    Sexual activity: Never   Other Topics Concern    Caffeine Concern Yes     Comment: diet coke    Exercise Yes     Comment: walking       Family History   Problem Relation Age of Onset    Other (Other) Mother         thyroid    Other (RA) Maternal Grandmother     Hypertension Maternal Grandmother     Hypertension Maternal Grandfather     Lipids Maternal Grandfather     Heart Disorder Paternal Grandfather     Lipids Paternal Grandfather          Home Robbins MD, FAES, FAAN  Board-Certified in Neurology, Epilepsy, and Clinical Neurophysiology  [facility]   (0) independent

## 2024-08-20 NOTE — PATIENT INSTRUCTIONS
Refill policies:    Allow 2-3 business days for refills; controlled substances may take longer.  Contact your pharmacy at least 5 days prior to running out of medication and have them send an electronic request or submit request through the “request refill” option in your [email or web address] account.  Refills are not addressed on weekends; covering physicians do not authorize routine medications on weekends.  No narcotics or controlled substances are refilled after noon on Fridays or by on call physicians.  By law, narcotics must be electronically prescribed.  A 30 day supply with no refills is the maximum allowed.  If your prescription is due for a refill, you may be due for a follow up appointment.  To best provide you care, patients receiving routine medications need to be seen at least once a year.  Patients receiving narcotic/controlled substance medications need to be seen at least once every 3 months.  In the event that your preferred pharmacy does not have the requested medication in stock (e.g. Backordered), it is your responsibility to find another pharmacy that has the requested medication available.  We will gladly send a new prescription to that pharmacy at your request.    Scheduling Tests:    If your physician has ordered radiology tests such as MRI or CT scans, please contact Central Scheduling at [phone] right away to schedule the test.  Once scheduled, the [facility] Centralized Referral Team will work with your insurance carrier to obtain pre-certification or prior authorization.  Depending on your insurance carrier, approval may take 3-10 days.  It is highly recommended patients assure they have received an authorization before having a test performed.  If test is done without insurance authorization, patient may be responsible for the entire amount billed.      Precertification and Prior Authorizations:  If your physician has recommended that you have a procedure or additional testing performed the [facility]  Centralized Referral Team will contact your insurance carrier to obtain pre-certification or prior authorization.    You are strongly encouraged to contact your insurance carrier to verify that your procedure/test has been approved and is a COVERED benefit.  Although the [facility] Centralized Referral Team does its due diligence, the insurance carrier gives the disclaimer that \"Although the procedure is authorized, this does not guarantee payment.\"    Ultimately the patient is responsible for payment.   Thank you for your understanding in this matter.  Paperwork Completion:  If you require FMLA or disability paperwork for your recovery, please make sure to either drop it off or have it faxed to our office at [phone]. Be sure the form has your name and date of birth on it.  The form will be faxed to our Forms Department and they will complete it for you.  There is a 25$ fee for all forms that need to be filled out.  Please be aware there is a 10-14 day turnaround time.  You will need to sign a release of information (PHILIPPE) form if your paperwork does not come with one.  You may call the Forms Department with any questions at [phone].  Their fax number is [phone].

## 2024-08-23 NOTE — TELEPHONE ENCOUNTER
Patients mother advised and verbalized understanding. Will have bloodwork completed before visit.       Home Robbins MD   to Debbi Duvall Nurse       8/23/24  2:55 PM  Let's have them check oxcarb level, and Sodium level, 1 week prior to the next visit.

## 2024-08-23 NOTE — TELEPHONE ENCOUNTER
Spoke with patient's  mother and she was able to  Nayzilam with co-pay card as directed below.    Mother would like to know when Oxcarbazepine level needs to be drawn and any if any further blood work needs to be completed.    Per OV notes;      However, her seizure medication is not yet likely therapeutic, and we won't know its effectiveness without more time passing, or without a blood level of OXC after being on a good dose for about 2 weeks (about 1-2 months on medication, though that is subject to debate).  I wouldn't recommend flying or a long road trip (e.g., to [place]).

## 2024-08-26 ENCOUNTER — PATIENT MESSAGE (OUTPATIENT)
Dept: NEUROLOGY | Facility: CLINIC | Age: 19
End: 2024-08-26

## 2024-08-27 NOTE — TELEPHONE ENCOUNTER
Relayed Dr. Mercado message to pt via [employer];    I would NOT recommend xanax (or other similar medication) use.  It can trigger withdrawal seizures and people can become addicted/dependent.  I don't believe remeron is related to the seizure, that medication (or other similar medication) is fine to have.  Bupropion should NOT be used for anxiety, as it can trigger seizures.    It is unusual, but possible to have side effects like this from oxcarb.  I would give your body about 4-6 weeks to get used to the medication and see if it improves.  If you have any more greer vu events, I would be more concerned about seizures, but otherwise I don't necessarily suspect ongoing seizures.    Thanks    Dr. Robbins

## 2024-08-27 NOTE — TELEPHONE ENCOUNTER
From: Missy Stuart  To: Home Robbins  Sent: 2024 5:35 PM CDT  Subject: Medication Questions for Missy Sade  2005    Hi Dr. Robbins,    This is Missy Stuart.     I have a few questions about medication.     1) Does OXCARBAZEPINE have short term memory loss as a side effect? I've been noticing some problems lately with remembering things that I did (where we went out to dinner last week, it turned out to be a BBQ restaurant that I would normally remember) and also with remembering the name of a street near where I live in [place]. Also some difficulty with finding words. It's hard to know if it's the medication or the seizures, but thought I'd let you know and see what you think.     2) I have diagnosed anxiety and trouble falling asleep. My psychiatrist Dr. Judy Sky in [place] asked me to ask you if it would be OK if she were to prescribe Xanax .025 mg to take before bedtime? I also take 20 mg of Prozac every morning. I would appreciate if you could let me know as soon as possible because I could use the help falling asleep.     3) Alternatively, she could prescribe 75 mg of Remeron at night for sleep, but it didn't really help me in the past and we were also wondering if the Remeron may have contributed to the aura seizures I was having?     Please let me know and thank you, Missy Stuart

## 2024-08-28 ENCOUNTER — LAB ENCOUNTER (OUTPATIENT)
Dept: LAB | Age: 19
End: 2024-08-28
Attending: FAMILY MEDICINE
Payer: COMMERCIAL

## 2024-08-28 ENCOUNTER — OFFICE VISIT (OUTPATIENT)
Dept: FAMILY MEDICINE CLINIC | Facility: CLINIC | Age: 19
End: 2024-08-28
Payer: COMMERCIAL

## 2024-08-28 VITALS
WEIGHT: 159.19 LBS | BODY MASS INDEX: 26.52 KG/M2 | HEIGHT: 65 IN | TEMPERATURE: 97 F | DIASTOLIC BLOOD PRESSURE: 70 MMHG | HEART RATE: 78 BPM | SYSTOLIC BLOOD PRESSURE: 110 MMHG | RESPIRATION RATE: 14 BRPM

## 2024-08-28 DIAGNOSIS — G40.109 FOCAL EPILEPSY (HCC): Primary | ICD-10-CM

## 2024-08-28 DIAGNOSIS — R79.89 LOW SERUM SODIUM: ICD-10-CM

## 2024-08-28 DIAGNOSIS — D72.829 LEUKOCYTOSIS, UNSPECIFIED TYPE: ICD-10-CM

## 2024-08-28 DIAGNOSIS — N28.9 RENAL INSUFFICIENCY: ICD-10-CM

## 2024-08-28 DIAGNOSIS — Z79.899 ENCOUNTER FOR LONG-TERM (CURRENT) DRUG USE: ICD-10-CM

## 2024-08-28 DIAGNOSIS — G40.109 FOCAL EPILEPSY (HCC): ICD-10-CM

## 2024-08-28 DIAGNOSIS — E87.6 LOW SERUM POTASSIUM: ICD-10-CM

## 2024-08-28 LAB
ALBUMIN SERPL-MCNC: 4.8 G/DL (ref 3.2–4.8)
ALBUMIN/GLOB SERPL: 1.7 {RATIO} (ref 1–2)
ALP LIVER SERPL-CCNC: 106 U/L
ALT SERPL-CCNC: 10 U/L
ANION GAP SERPL CALC-SCNC: 3 MMOL/L (ref 0–18)
AST SERPL-CCNC: 22 U/L (ref ?–34)
BASOPHILS # BLD AUTO: 0.12 X10(3) UL (ref 0–0.2)
BASOPHILS NFR BLD AUTO: 1.6 %
BILIRUB SERPL-MCNC: 0.4 MG/DL (ref 0.3–1.2)
BUN BLD-MCNC: 8 MG/DL (ref 9–23)
CALCIUM BLD-MCNC: 10.2 MG/DL (ref 8.7–10.4)
CHLORIDE SERPL-SCNC: 102 MMOL/L (ref 98–112)
CO2 SERPL-SCNC: 30 MMOL/L (ref 21–32)
CREAT BLD-MCNC: 0.85 MG/DL
EGFRCR SERPLBLD CKD-EPI 2021: 101 ML/MIN/1.73M2 (ref 60–?)
EOSINOPHIL # BLD AUTO: 1.28 X10(3) UL (ref 0–0.7)
EOSINOPHIL NFR BLD AUTO: 16.7 %
ERYTHROCYTE [DISTWIDTH] IN BLOOD BY AUTOMATED COUNT: 12.1 %
FASTING STATUS PATIENT QL REPORTED: YES
GLOBULIN PLAS-MCNC: 2.9 G/DL (ref 2–3.5)
GLUCOSE BLD-MCNC: 81 MG/DL (ref 70–99)
HCT VFR BLD AUTO: 41.9 %
HGB BLD-MCNC: 14.2 G/DL
IMM GRANULOCYTES # BLD AUTO: 0.04 X10(3) UL (ref 0–1)
IMM GRANULOCYTES NFR BLD: 0.5 %
LYMPHOCYTES # BLD AUTO: 1.98 X10(3) UL (ref 1–4)
LYMPHOCYTES NFR BLD AUTO: 25.8 %
MCH RBC QN AUTO: 29.3 PG (ref 26–34)
MCHC RBC AUTO-ENTMCNC: 33.9 G/DL (ref 31–37)
MCV RBC AUTO: 86.4 FL
MONOCYTES # BLD AUTO: 0.55 X10(3) UL (ref 0.1–1)
MONOCYTES NFR BLD AUTO: 7.2 %
NEUTROPHILS # BLD AUTO: 3.7 X10 (3) UL (ref 1.5–7.7)
NEUTROPHILS # BLD AUTO: 3.7 X10(3) UL (ref 1.5–7.7)
NEUTROPHILS NFR BLD AUTO: 48.2 %
OSMOLALITY SERPL CALC.SUM OF ELEC: 277 MOSM/KG (ref 275–295)
PLATELET # BLD AUTO: 349 10(3)UL (ref 150–450)
POTASSIUM SERPL-SCNC: 4.3 MMOL/L (ref 3.5–5.1)
PROT SERPL-MCNC: 7.7 G/DL (ref 5.7–8.2)
RBC # BLD AUTO: 4.85 X10(6)UL
SODIUM SERPL-SCNC: 135 MMOL/L (ref 136–145)
WBC # BLD AUTO: 7.7 X10(3) UL (ref 4–11)

## 2024-08-28 PROCEDURE — 99214 OFFICE O/P EST MOD 30 MIN: CPT | Performed by: FAMILY MEDICINE

## 2024-08-28 PROCEDURE — 85025 COMPLETE CBC W/AUTO DIFF WBC: CPT

## 2024-08-28 PROCEDURE — 80053 COMPREHEN METABOLIC PANEL: CPT

## 2024-08-28 PROCEDURE — 80183 DRUG SCRN QUANT OXCARBAZEPIN: CPT

## 2024-08-28 RX ORDER — NEOMYCIN SULFATE, POLYMYXIN B SULFATE, HYDROCORTISONE 3.5; 10000; 1 MG/ML; [USP'U]/ML; MG/ML
4 SOLUTION/ DROPS AURICULAR (OTIC) 2 TIMES DAILY
COMMUNITY
Start: 2024-08-27 | End: 2024-09-06

## 2024-08-28 RX ORDER — MIRTAZAPINE 7.5 MG/1
7.5 TABLET, FILM COATED ORAL NIGHTLY
COMMUNITY
Start: 2024-08-27

## 2024-08-28 NOTE — PATIENT INSTRUCTIONS
Healthy diet.  Stay active.   Do blood work.  Further recommendation pending test result  Follow-up with neurologist as scheduled.

## 2024-08-28 NOTE — PROGRESS NOTES
Missy Stuart is a 19 year old female.  Epilepsy, discussed test results.  HPI:   Patient is coming to the office for follow-up from the emergency room visit from August 14, 2024.  Patient states that late July of this year she started to have some episodes of déjà vu.  Had some episode of numbness and tingling in the hands.  There was episode of déjà vu initially was 1-2 a day then started to be more often.  On the day of the seizure she had 7-10 episode like that.  Had numbness and tingling of the face she had a hard time to talk.  Mom noticed that she wants something but she was not able to say what she wanted.  On the day of the seizures patient was helping mom with picking up some bedding for her brother.  She looked up started to spin.  Grand mal seizure with prolonged postictal state.  Was taken to emergency room.  Saw neurologist Dr. Davies after that.  Started on medication of Oxcarbazepine.  Medication well.  She has a hard time to sleep at night and was told that Remeron is okay to use.  She is trying to have a teens.  However was sleeping longer today.  She is in college in [place].  Started doing some online college right now.  Patient is status post ACL surgery left knee.  During episodes she has a hard time was trying to resist the paramedics.  She is seeing orthopedic doctor it is monitored for now.  Patient is on concerns with medication and going back to school.  Blood work was done in emergency room showed leukocytosis.  She had also abnormal electrolytes.  White blood cell count was going down after recheck by neurologist.  Electrolytes were better creatinine was elevated patient will have blood work done today.      Current Outpatient Medications   Medication Sig Dispense Refill   • mirtazapine 7.5 MG Oral Tab Take 1 tablet (7.5 mg total) by mouth nightly.     • neomycin-polymyxin-hydrocortisone 3.5-25210-9 Otic Solution Place 4 drops in ear(s) 2 (two) times daily.     • Midazolam  (NAYZILAM) 5 MG/0.1ML Nasal Solution 5 mg by Nasal route as needed (seizure with shaking greater than 5 minutes). 1 each 0   • OXcarbazepine 150 MG Oral Tab One tab twice a day x 5 days, then two tabs twice a day x 5 days, then 3 tabs twice a day x 5 days 180 tablet 11   • FLUoxetine 20 MG Oral Cap Take 1 capsule (20 mg total) by mouth every morning.        Past Medical History:   • Anxiety   • Hearing aid worn      Social History:  Social History     Socioeconomic History   • Marital status: Single   Tobacco Use   • Smoking status: Never   • Smokeless tobacco: Never   Vaping Use   • Vaping status: Never Used   Substance and Sexual Activity   • Alcohol use: No   • Drug use: No   • Sexual activity: Never   Other Topics Concern   • Caffeine Concern Yes     Comment: diet coke   • Exercise Yes     Comment: walking        REVIEW OF SYSTEMS:   GENERAL HEALTH: feels well otherwise  SKIN: denies any unusual skin lesions or rashes  HEENT no congestion no runny nose no sore throat  Neck no neck pain  RESPIRATORY: denies shortness of breath with exertion  CARDIOVASCULAR: denies chest pain on exertion  GI: denies abdominal pain and denies heartburn  NEURO: denies headaches, had seizure August 14, 2024  Psych normal mood    EXAM:   /70 (BP Location: Left arm, Patient Position: Sitting, Cuff Size: adult)   Pulse 78   Temp 97.2 °F (36.2 °C) (Temporal)   Resp 14   Ht 5' 5\" (1.651 m)   Wt 159 lb 3.2 oz (72.2 kg)   LMP 07/10/2024 (Exact Date)   BMI 26.49 kg/m²   GENERAL: well developed, well nourished,in no apparent distress, patient is here with her mom.  SKIN: no rashes,no suspicious lesions  HEENT: atraumatic, normocephalic,  NECK: supple,no adenopathy  LUNGS: clear to auscultation  CARDIO: RRR without murmur  GI: good BS's,no masses, HSM or tenderness  EXTREMITIES: no edema  Psychiatric - alert  and oriented x3, normal mood   PROCEDURE:  MRI BRAIN (W+WO) (CPT=70553)       COMPARISON:  [facility] , CT, CT BRAIN OR HEAD  (81146), 8/14/2024, 4:55 AM.       INDICATIONS:  G40.109 Focal epilepsy (HCC)       TECHNIQUE:  MRI of the brain was performed with multi-planar T1, T2-weighted images with FLAIR sequences and diffusion weighted images without and with infusion.       PATIENT STATED HISTORY:(As transcribed by Technologist)  Focal epilepsy. Patient states she had a seizure 1 week ago.   Per neuro request--- Concern for epilepsy, please include thin slice coronal T1 and FLAIR through the temporal lobes.        CONTRAST USED:  14 mL of Dotarem       FINDINGS:  Motion degraded examination.       The ventricles and sulci are within normal limits.  There is no midline shift or mass effect.  The basal cisterns are patent.         The temporal lobes are symmetric and unremarkable. There is no evidence of cortical dysplasia or migrational abnormality. No heterotopic gray matter is identified. The sulcal gyral pattern is within normal limits for a patient of this age.       There is a possible 9 x 5 mm arachnoid cyst at the medial inferior aspect of the left middle cranial fossa (series 4, image 10).       There is no acute intracranial hemorrhage or significant extra-axial fluid collection identified.  There is no parenchymal signal abnormality identified.  There is no abnormal parenchymal or leptomeningeal enhancement.  There is no restricted diffusion   to suggest acute ischemia/infarction.       The expected major intracranial flow voids are present.       Moderate mucosal thickening within the paranasal sinuses.                        Impression   CONCLUSION:  No acute intracranial abnormality is identified.           LOCATION:  [facility]               Dictated by (CST): Stromberg, LeRoy, MD on 8/19/2024 at 1:44 PM       Finalized by (CST): Stromberg, LeRoy, MD on 8/19/2024 at 1:56 PM          Results for orders placed or performed in visit on 08/14/24   CBC With Differential With Platelet   Result Value Ref Range    WBC 11.3 (H) 4.0 - 11.0  x10(3) uL    RBC 4.64 3.80 - 5.30 x10(6)uL    HGB 13.7 12.0 - 16.0 g/dL    HCT 41.0 35.0 - 48.0 %    MCV 88.4 80.0 - 100.0 fL    MCH 29.5 26.0 - 34.0 pg    MCHC 33.4 31.0 - 37.0 g/dL    RDW-SD 41.0 35.1 - 46.3 fL    RDW 12.6 11.0 - 15.0 %    .0 150.0 - 450.0 10(3)uL    Neutrophil Absolute Prelim 8.15 (H) 1.50 - 7.70 x10 (3) uL    Neutrophil Absolute 8.15 (H) 1.50 - 7.70 x10(3) uL    Lymphocyte Absolute 1.40 1.00 - 4.00 x10(3) uL    Monocyte Absolute 1.37 (H) 0.10 - 1.00 x10(3) uL    Eosinophil Absolute 0.30 0.00 - 0.70 x10(3) uL    Basophil Absolute 0.04 0.00 - 0.20 x10(3) uL    Immature Granulocyte Absolute 0.06 0.00 - 1.00 x10(3) uL    Neutrophil % 71.9 %    Lymphocyte % 12.4 %    Monocyte % 12.1 %    Eosinophil % 2.7 %    Basophil % 0.4 %    Immature Granulocyte % 0.5 %   Basic Metabolic Panel (8)   Result Value Ref Range    Glucose 89 70 - 99 mg/dL    Sodium 138 136 - 145 mmol/L    Potassium 3.7 3.5 - 5.1 mmol/L    Chloride 107 98 - 112 mmol/L    CO2 22.0 21.0 - 32.0 mmol/L    Anion Gap 9 0 - 18 mmol/L    BUN 11 9 - 23 mg/dL    Creatinine 1.03 (H) 0.55 - 1.02 mg/dL    BUN/CREA Ratio 10.7 10.0 - 20.0    Calcium, Total 9.8 8.7 - 10.4 mg/dL    Calculated Osmolality 285 275 - 295 mOsm/kg    eGFR-Cr 80 >=60 mL/min/1.73m2    Patient Fasting for BMP? No      ASSESSMENT AND PLAN:     Encounter Diagnoses   Name Primary?   • Focal epilepsy (HCC) Yes   • Leukocytosis, unspecified type    • Renal insufficiency        Orders Placed This Encounter   Procedures   • Comp Metabolic Panel (14)   • CBC With Differential With Platelet       Meds & Refills for this Visit:  Requested Prescriptions      No prescriptions requested or ordered in this encounter     Healthy diet.  Stay active.   Do blood work.  Further recommendation pending test result  Follow-up with neurologist as scheduled.    I have reviewed ER records from the visit from August 20, 2024 also reviewed notes from the neurologist.    Imaging &  Consults:  None    The patient indicates understanding of these issues and agrees to the plan.  The patient is asked to return in prn.  Time spent was 34 minutes spend  on  visit and documentation.  /The note was dictated using speech recognition software.  Accuracy and grammar in transcription may be subject to error.

## 2024-08-30 ENCOUNTER — NURSE ONLY (OUTPATIENT)
Dept: ELECTROPHYSIOLOGY | Facility: HOSPITAL | Age: 19
End: 2024-08-30
Attending: Other
Payer: COMMERCIAL

## 2024-08-30 DIAGNOSIS — G40.109 FOCAL EPILEPSY (HCC): ICD-10-CM

## 2024-08-30 PROCEDURE — 95813 EEG EXTND MNTR 61-119 MIN: CPT

## 2024-09-02 LAB — OXCARBAZEPINE LVL: 20 UG/ML

## 2024-09-03 ENCOUNTER — PATIENT MESSAGE (OUTPATIENT)
Facility: CLINIC | Age: 19
End: 2024-09-03

## 2024-09-03 ENCOUNTER — TELEPHONE (OUTPATIENT)
Dept: NEUROLOGY | Facility: CLINIC | Age: 19
End: 2024-09-03

## 2024-09-03 NOTE — TELEPHONE ENCOUNTER
From: Missy Stuart  To: Home Robbins  Sent: 9/3/2024 3:41 PM CDT  Subject: EEG results    Hi Dr. Robbins, My EEG results are in but not visible to me on My Chart. When I click on the result link it's just a blank page. Could you please help me access the report? Thank you, Missy Stuart

## 2024-09-03 NOTE — TELEPHONE ENCOUNTER
Pt would like to leave for college 9/13, her father is driving her. Her PCP already ran Oxcarbazepine (Trileptal), Serum. Pt's mother wants to know if it was too early, should it be completed again. Pt's f/u appt is scheduled 9/25 and is added to the wait list. Does Provider want to see her prior to leaving? Mother would like a quick 5 minute visit or call this week or next week. Please advise. Mother's best cb # [phone]. Endorsed to RN.

## 2024-09-03 NOTE — TELEPHONE ENCOUNTER
Patients mother advised but were prefer to be seen in clinic.    Future Appointments   Date Time Provider Department Center   9/7/2024  3:15 PM [facility] CARD STRESS ECHO [facility] 1 [facility]   9/10/2024  9:30 AM Home Robbins MD ENINAPER [facility] [place]   9/11/2024  1:00 PM Meme Soto MD [facility] [id number]   9/25/2024  3:00 PM [facility]         Home Robbins MD   to [place] Nurse       9/3/24 11:07 AM  Her blood level looks good.  If she is not having any more greer vu or any unusual feelings, I think it is ok for her to go back to school.  If they have more questions, lets schedule an appointment (9:30 or 10:00 on 9/10 are available), but otherwise it's not necessary.

## 2024-09-07 ENCOUNTER — HOSPITAL ENCOUNTER (OUTPATIENT)
Dept: CV DIAGNOSTICS | Facility: HOSPITAL | Age: 19
Discharge: HOME | End: 2024-09-07
Attending: INTERNAL MEDICINE
Payer: COMMERCIAL

## 2024-09-07 DIAGNOSIS — R56.9 SEIZURES (HCC): ICD-10-CM

## 2024-09-07 DIAGNOSIS — R55 SYNCOPE: ICD-10-CM

## 2024-09-07 PROCEDURE — 93306 TTE W/DOPPLER COMPLETE: CPT | Performed by: INTERNAL MEDICINE

## 2024-09-09 ENCOUNTER — OFFICE VISIT (OUTPATIENT)
Facility: CLINIC | Age: 19
End: 2024-09-09
Payer: COMMERCIAL

## 2024-09-09 VITALS — SYSTOLIC BLOOD PRESSURE: 112 MMHG | DIASTOLIC BLOOD PRESSURE: 72 MMHG | RESPIRATION RATE: 16 BRPM | HEART RATE: 72 BPM

## 2024-09-09 DIAGNOSIS — G40.109 FOCAL EPILEPSY (HCC): Primary | ICD-10-CM

## 2024-09-09 PROCEDURE — 99215 OFFICE O/P EST HI 40 MIN: CPT | Performed by: OTHER

## 2024-09-09 NOTE — PROGRESS NOTES
Neurology History & Physical     ASSESSMENT & PLAN:      ICD-10-CM    1. Focal epilepsy (HCC)  G40.109           New onset epilepsy, with focal seizures, improving but not to goal of 6 months seizure free.  She had daily greer vu leading up to the GTC seizure on 8/14/24.  This poses threat to bodily function.  Cont OXC now, avoiding LEV given psych history, could consider LCM if needed.  MRI and EEG unremarkable.      I advised that she should consider therapy for her anxiety.    I don't have a strong contraindication to returning to school, or flying back here for holidays (depending on clinical course).  We discussed being careful to avoid triggers (e.g., sleep deprivation, EtOH/drugs, missing medications).      We privately discussed various issues regarding women with epilepsy (WWE), including but not limited to the following.  She is not sexually active currently, but used to be, and was previously on OCPs and previous did use condoms as contraception.  The fact that most WWE have healthy babies  Potential teratogenicity of seizure medications, and that the critical period of exposure and organ development is often prior to knowing she is pregnant  Recommendation to never stop or reduce seizure medication without discussion with neurologist  That risk of seizure often outweighs risk of medication  Pharmacokinetic issues, including but not limited to OCP interactions and decrease in levels during pregnancy  In the appropriate setting, consideration of contraception in addition to barrier methods (e.g., high dose oral estrogen, oral progesterone, or non-hormonal IUDs; low dose oral estrogen methods may NOT prevent pregnancy if the patient is on medications such as TPM, OXC, CBZ, PhB, PHT)  Taking 1 mg folic acid daily during all years of childbearing potential during which she is sexually active    Total time I spent caring for the patient was 42 minutes on the day of the encounter related to this visit, including  chart review and documentation before and after the visit, including time spent during the visit, but not including separately reported activities or procedures.    Return in about 3 months (around 12/9/2024).       ~~~~~~~~~~~~~~~~~~~~~~~~~~~    CHIEF COMPLAINT / REASON FOR VISIT:    Chief Complaint   Patient presents with    Seizures     HISTORY OBTAINED FROM:  Patient and others as above  Chart review    HISTORY:  Missy Stuart is a 19 year old female with new events of very intense greer vu for the past week.  There is BUE numbness and sweaty, increasing frequency each day (had 7 yesterday).  Today at 1 am, she was with her mom and brother, and had another greer vu feeling, more intense and prolonged.  She had a bad taste in her mouth.  She felt the numbness, was washing her hands and face due to symptoms.  She could not get her words out, and then she began to spin in a [place].  Her mom hugged her, her head jerked upward and she had a generalized convulsion, foaming at the mouth.  EMS was called.  Shaking stopped in 4-5 minutes.  Was taken to ER here and evaluated.    Has been on prozac since May 2024, and remeron since July 2024.  She forgot to take both for 3 days (Fri-Sat-Sun).  On these for anxiety and insomnia.    INTERIM HISTORY:  On  mg BID, no further seizures or greer vu events.  She has had some increasing headaches since higher dose, about 3 per week, triggered by dehydration.  Takes tylenol about once every to week.  She is having some lapses in memory - but this is actually WFD - she forgets words to a familiar song or a familiar street.  Her father is driving her back to [place] for [employer] tmrw, planning to do 3 online and 1 in-person class (will not be driving).  She does have some anxiety, has a psychiatrist, many times she is awake at night thinking about things.    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)    Epilepsy risk factors:  Normal birth and development   No febrile or childhood seizures   No meningitis/encephalitis  No head trauma with prolonged LOC/amnesia (did have a concussion at age 6 yrs, no LOC)  No known structural brain lesions  One cousin had a seizure    Previous medication trials:  none    DATA REVIEWED:  As documented in the history    Aug 2024  EEG normal  8/28/24 CBC, CMP unremarkable (Na 135), OXC level 20 (on 345 mg BID)  CBC, CMP unremarkable  MRI brain unremarkable (arachnoid cyst is not significant)    Head CT unremarkable (I independently analyzed and interpreted this, and I agree with the reading physician's report.)  ER note  CBC WBC 18.5  CMP Na 133, K 3.2, CO2 14.5    PHYSICAL EXAMINATION:  /72   Pulse 72   Resp 16   LMP 07/10/2024 (Exact Date)     Gen: in NAD  MSE: nl attn/conc, nl language, nl fund of knowledge  CN: EOMI, VFF, nl facial mvmt, nl palate, nl tongue  Motor: 5/5 x4  DTR: 2+ BUE and BLE  Coord: nl FTN b/I  Gait: normal    No Known Allergies    Current medications:   mirtazapine 7.5 MG Oral Tab Take 1 tablet (7.5 mg total) by mouth as needed.      Midazolam (NAYZILAM) 5 MG/0.1ML Nasal Solution 5 mg by Nasal route as needed (seizure with shaking greater than 5 minutes). 1 each 0    OXcarbazepine 150 MG Oral Tab One tab twice a day x 5 days, then two tabs twice a day x 5 days, then 3 tabs twice a day x 5 days (Patient taking differently: 3 tabs twice a day) 180 tablet 11    FLUoxetine 20 MG Oral Cap Take 1 capsule (20 mg total) by mouth every morning.         Past Medical History:    Anxiety    Hearing aid worn       Past Surgical History:   Procedure Laterality Date    Knee surgery Left 06/05/2024    ACL       Social History     Socioeconomic History    Marital status: Single   Tobacco Use    Smoking status: Never    Smokeless tobacco: Never   Vaping Use    Vaping status: Never Used   Substance and Sexual Activity    Alcohol use: No    Drug use: No     Sexual activity: Never   Other Topics Concern    Caffeine Concern Yes     Comment: 1 diet coke per day, occasionally coffee    Exercise Yes     Comment: walking       Family History   Problem Relation Age of Onset    Other (Other) Mother         thyroid    Other (RA) Maternal Grandmother     Hypertension Maternal Grandmother     Hypertension Maternal Grandfather     Lipids Maternal Grandfather     Heart Disorder Paternal Grandfather     Lipids Paternal Grandfather          Home Robbins MD, FAES, FAAN  Board-Certified in Neurology, Epilepsy, and Clinical Neurophysiology  [facility]

## 2024-09-09 NOTE — PATIENT INSTRUCTIONS
After your visit at the [facility] office  today,  please direct any follow up questions or medication needs to the staff in our  [place] office so that your concerns may be promptly addressed.  We are available through [employer] or at the numbers below:    The phone number is:   [phone] option #1    The fax number is:  [phone]    Your pharmacy should also send any requests electronically to the [place] office.     Refill policies:    Allow 2-3 business days for refills; controlled substances may take longer.  Contact your pharmacy at least 5 days prior to running out of medication and have them send an electronic request or submit request through the “request refill” option in your [employer] account.  Refills are not addressed on weekends; covering physicians do not authorize routine medications on weekends.  No narcotics or controlled substances are refilled after noon on Fridays or by on call physicians.  By law, narcotics must be electronically prescribed.  A 30 day supply with no refills is the maximum allowed.  If your prescription is due for a refill, you may be due for a follow up appointment.  To best provide you care, patients receiving routine medications need to be seen at least once a year.  Patients receiving narcotic/controlled substance medications need to be seen at least once every 3 months.  In the event that your preferred pharmacy does not have the requested medication in stock (e.g. Backordered), it is your responsibility to find another pharmacy that has the requested medication available.  We will gladly send a new prescription to that pharmacy at your request.    Scheduling Tests:    If your physician has ordered radiology tests such as MRI or CT scans, please contact Central Scheduling at [phone] right away to schedule the test.  Once scheduled, the [facility] Centralized Referral Team will work with your insurance carrier to obtain pre-certification or prior  authorization.  Depending on your insurance carrier, approval may take 3-10 days.  It is highly recommended patients assure they have received an authorization before having a test performed.  If test is done without insurance authorization, patient may be responsible for the entire amount billed.      Precertification and Prior Authorizations:  If your physician has recommended that you have a procedure or additional testing performed the [facility] Centralized Referral Team will contact your insurance carrier to obtain pre-certification or prior authorization.    You are strongly encouraged to contact your insurance carrier to verify that your procedure/test has been approved and is a COVERED benefit.  Although the [facility] Centralized Referral Team does its due diligence, the insurance carrier gives the disclaimer that \"Although the procedure is authorized, this does not guarantee payment.\"    Ultimately the patient is responsible for payment.   Thank you for your understanding in this matter.  Paperwork Completion:  If you require FMLA or disability paperwork for your recovery, please make sure to either drop it off or have it faxed to our office at [phone]. Be sure the form has your name and date of birth on it.  The form will be faxed to our Forms Department and they will complete it for you.  There is a 25$ fee for all forms that need to be filled out.  Please be aware there is a 10-14 day turnaround time.  You will need to sign a release of information (PHILIPPE) form if your paperwork does not come with one.  You may call the Forms Department with any questions at [phone].  Their fax number is [phone].

## 2024-10-10 ENCOUNTER — TELEPHONE (OUTPATIENT)
Dept: NEUROLOGY | Facility: CLINIC | Age: 19
End: 2024-10-10

## 2024-10-10 NOTE — TELEPHONE ENCOUNTER
Received [employer] eligibility forms from [place], [place]. Placed in provider folder for completion.

## 2024-10-13 ENCOUNTER — PATIENT MESSAGE (OUTPATIENT)
Facility: CLINIC | Age: 19
End: 2024-10-13

## 2024-10-14 NOTE — TELEPHONE ENCOUNTER
[employer] message sent    Home martino MD to Debbi [place] Nurse       10/14/24  9:10 AM  We should discuss starting a different medication and eventually getting off oxcarb due to side effects.  Can we move her appointment up, or is she still out of state?

## 2024-10-15 ENCOUNTER — TELEPHONE (OUTPATIENT)
Facility: CLINIC | Age: 19
End: 2024-10-15

## 2024-10-15 RX ORDER — LAMOTRIGINE 25 MG/1
TABLET ORAL
Qty: 42 TABLET | Refills: 0 | Status: SHIPPED | OUTPATIENT
Start: 2024-10-15

## 2024-10-15 NOTE — TELEPHONE ENCOUNTER
For past 3 weeks she is having an \"uncomfortable sensation\", similar to a warning before her GTCS (but not greer vu).  Happens 2-3 times a week, lasting less than 20 seconds.  Oxcarb making her very tired.    Discussed LTG titration. Will send first months' Rx.  Discussed side effects, including but not limited to life threatening reactions and rashes.

## 2024-10-15 NOTE — TELEPHONE ENCOUNTER
Called number on chart - it was mom's number.  She gave me pt's number, [phone].  Called it, no answer.

## 2024-10-15 NOTE — TELEPHONE ENCOUNTER
Paperwork received back from provider for completion.     Returned back to provider for signature if appropriate.

## 2024-11-04 ENCOUNTER — PATIENT MESSAGE (OUTPATIENT)
Facility: CLINIC | Age: 19
End: 2024-11-04

## 2024-11-04 RX ORDER — LAMOTRIGINE 100 MG/1
TABLET ORAL
Qty: 60 TABLET | Refills: 11 | Status: SHIPPED | OUTPATIENT
Start: 2024-11-04

## 2024-11-04 RX ORDER — LAMOTRIGINE 25 MG/1
TABLET ORAL
Qty: 42 TABLET | Refills: 0 | Status: CANCELLED | OUTPATIENT
Start: 2024-11-04

## 2024-11-04 RX ORDER — LAMOTRIGINE 25 MG/1
TABLET ORAL
Qty: 42 TABLET | Refills: 0 | OUTPATIENT
Start: 2024-11-04

## 2024-11-04 NOTE — TELEPHONE ENCOUNTER
Medication: Lamotrigine 25 mg     Date of last refill: 10/15/2024 (#42/0)  [place]     Last office visit: 09/09/2024  Due back to clinic per last office note:  3 months  Date next office visit scheduled:    Future Appointments   Date Time Provider Department Center   11/18/2024  2:00 PM Home Robbins MD [facility]   11/27/2024 11:30 AM Home Robbins MD [facility]   12/18/2024  2:30 PM Home Robbins MD [facility]           Last OV note recommendation:    New onset epilepsy, with focal seizures, improving but not to goal of 6 months seizure free.  She had daily greer vu leading up to the GTC seizure on 8/14/24.  This poses threat to bodily function.  Cont OXC now, avoiding LEV given psych history, could consider LCM if needed.  MRI and EEG unremarkable.       I advised that she should consider therapy for her anxiety.     I don't have a strong contraindication to returning to school, or flying back here for holidays (depending on clinical course).  We discussed being careful to avoid triggers (e.g., sleep deprivation, EtOH/drugs, missing medications).       We privately discussed various issues regarding women with epilepsy (WWE), including but not limited to the following.  She is not sexually active currently, but used to be, and was previously on OCPs and previous did use condoms as contraception.  The fact that most WWE have healthy babies  Potential teratogenicity of seizure medications, and that the critical period of exposure and organ development is often prior to knowing she is pregnant  Recommendation to never stop or reduce seizure medication without discussion with neurologist  That risk of seizure often outweighs risk of medication  Pharmacokinetic issues, including but not limited to OCP interactions and decrease in levels during pregnancy  In the appropriate setting, consideration of contraception in addition to barrier methods (e.g.,  high dose oral estrogen, oral progesterone, or non-hormonal IUDs; low dose oral estrogen methods may NOT prevent pregnancy if the patient is on medications such as TPM, OXC, CBZ, PhB, PHT)  Taking 1 mg folic acid daily during all years of childbearing potential during which she is sexually active

## 2024-11-12 ENCOUNTER — PATIENT MESSAGE (OUTPATIENT)
Facility: CLINIC | Age: 19
End: 2024-11-12

## 2024-11-12 DIAGNOSIS — G40.109 FOCAL EPILEPSY (HCC): ICD-10-CM

## 2024-11-12 DIAGNOSIS — G40.909 ACUTE REPETITIVE SEIZURE (HCC): ICD-10-CM

## 2024-11-12 DIAGNOSIS — G40.909 ACUTE REPETITIVE SEIZURE (HCC): Primary | ICD-10-CM

## 2024-11-12 RX ORDER — LAMOTRIGINE 100 MG/1
TABLET ORAL
Qty: 60 TABLET | Refills: 11 | Status: SHIPPED | OUTPATIENT
Start: 2024-11-12

## 2024-11-12 RX ORDER — MIDAZOLAM 5 MG/.1ML
5 SPRAY NASAL AS NEEDED
Qty: 1 EACH | Refills: 0 | Status: SHIPPED | OUTPATIENT
Start: 2024-11-12

## 2024-11-12 RX ORDER — LAMOTRIGINE 25 MG/1
25 TABLET ORAL 2 TIMES DAILY
Qty: 60 TABLET | Refills: 5
Start: 2024-11-12

## 2024-11-12 RX ORDER — LAMOTRIGINE 100 MG/1
TABLET ORAL
Qty: 60 TABLET | Refills: 11 | Status: CANCELLED | OUTPATIENT
Start: 2024-11-12

## 2024-11-12 NOTE — TELEPHONE ENCOUNTER
Patient needs a refill Midazolam (NAYZILAM) 5 MG/0.1ML Nasal Solution and send to       [id number] - [facility] [place] - [address] AT [facility], [phone], [phone]   [address] [facility] [place] [facility] [address]   Phone: [phone] Fax: [phone]   Hours: Not open 24 hours

## 2024-11-12 NOTE — TELEPHONE ENCOUNTER
Medication: Nayzilam     Date of last refill: 8/20/2024 (#1/0)  [place]20/2024     Last office visit: 9/9/2024  Due back to clinic per last office note:  3 months  Date next office visit scheduled:    Future Appointments   Date Time Provider Department Center   11/18/2024  2:00 PM Home Robbins MD [facility]   11/27/2024 11:30 AM Home Robbins MD [facility]   12/18/2024  2:30 PM Home Robbins MD [facility]           Last OV note recommendation:    New onset epilepsy, with focal seizures, improving but not to goal of 6 months seizure free.  She had daily greer vu leading up to the GTC seizure on 8/14/24.  This poses threat to bodily function.  Cont OXC now, avoiding LEV given psych history, could consider LCM if needed.  MRI and EEG unremarkable.       I advised that she should consider therapy for her anxiety.     I don't have a strong contraindication to returning to school, or flying back here for holidays (depending on clinical course).  We discussed being careful to avoid triggers (e.g., sleep deprivation, EtOH/drugs, missing medications).       We privately discussed various issues regarding women with epilepsy (WWE), including but not limited to the following.  She is not sexually active currently, but used to be, and was previously on OCPs and previous did use condoms as contraception.  The fact that most WWE have healthy babies  Potential teratogenicity of seizure medications, and that the critical period of exposure and organ development is often prior to knowing she is pregnant  Recommendation to never stop or reduce seizure medication without discussion with neurologist  That risk of seizure often outweighs risk of medication  Pharmacokinetic issues, including but not limited to OCP interactions and decrease in levels during pregnancy  In the appropriate setting, consideration of contraception in addition to barrier methods (e.g., high dose  oral estrogen, oral progesterone, or non-hormonal IUDs; low dose oral estrogen methods may NOT prevent pregnancy if the patient is on medications such as TPM, OXC, CBZ, PhB, PHT)  Taking 1 mg folic acid daily during all years of childbearing potential during which she is sexually active   Return in about 3 months (around 12/9/2024).

## 2024-11-13 ENCOUNTER — TELEPHONE (OUTPATIENT)
Dept: NEUROLOGY | Facility: CLINIC | Age: 19
End: 2024-11-13

## 2024-11-13 DIAGNOSIS — G40.909 ACUTE REPETITIVE SEIZURE (HCC): ICD-10-CM

## 2024-11-13 NOTE — TELEPHONE ENCOUNTER
Received prior authorization denial for Nayzilam. Coverage is provided for the diagnosis of intermittent episodes of frequent seizure activity(seizure clusters, acute repetitive seizures).     You may fax additional clinical information to [phone] or call [phone] to schedule a discussion regarding this determination with me or a clinical reviewer. Request [id number]..

## 2024-11-14 NOTE — TELEPHONE ENCOUNTER
Initiated prior authorization for Nayzilam through CoverMyMeds with office notes and letter of medical necessity.    Key: [id number]    Awaiting determination

## 2024-11-15 NOTE — TELEPHONE ENCOUNTER
Received approval for Nayzilam effective 10/15/24-11/14/2025. Faxed to dispensing pharmacy and [email or web address] message sent to patient.

## 2024-11-27 ENCOUNTER — OFFICE VISIT (OUTPATIENT)
Facility: CLINIC | Age: 19
End: 2024-11-27
Payer: COMMERCIAL

## 2024-11-27 VITALS
SYSTOLIC BLOOD PRESSURE: 120 MMHG | RESPIRATION RATE: 16 BRPM | DIASTOLIC BLOOD PRESSURE: 72 MMHG | BODY MASS INDEX: 27 KG/M2 | HEART RATE: 88 BPM | WEIGHT: 160 LBS

## 2024-11-27 DIAGNOSIS — G40.109 FOCAL EPILEPSY (HCC): Primary | ICD-10-CM

## 2024-11-27 DIAGNOSIS — Z79.899 ENCOUNTER FOR LONG-TERM (CURRENT) DRUG USE: ICD-10-CM

## 2024-11-27 DIAGNOSIS — G40.909 ACUTE REPETITIVE SEIZURE (HCC): ICD-10-CM

## 2024-11-27 PROCEDURE — 99214 OFFICE O/P EST MOD 30 MIN: CPT | Performed by: OTHER

## 2024-11-27 RX ORDER — OXCARBAZEPINE 150 MG/1
450 TABLET, FILM COATED ORAL 2 TIMES DAILY
Qty: 180 TABLET | Refills: 11 | Status: SHIPPED | OUTPATIENT
Start: 2024-11-27 | End: 2025-11-22

## 2024-11-27 RX ORDER — LAMOTRIGINE 100 MG/1
100 TABLET ORAL 2 TIMES DAILY
Qty: 60 TABLET | Refills: 11 | Status: SHIPPED | OUTPATIENT
Start: 2024-11-27

## 2024-11-27 NOTE — PATIENT INSTRUCTIONS
Instructions from Dr. Robbins:       Anytime after 12/12, let's check the blood level.      ~~~~~~~~~~~~~~~~~~~~~~~  After your visit at the [facility] office  today,  please direct any follow up questions or medication needs to the staff in our  [place] office so that your concerns may be promptly addressed.  We are available through [employer] or at the numbers below:    The phone number is:   [phone] option #1    The fax number is:  [phone]    Your pharmacy should also send any requests electronically to the [place] office.     Refill policies:    Allow 2-3 business days for refills; controlled substances may take longer.  Contact your pharmacy at least 5 days prior to running out of medication and have them send an electronic request or submit request through the “request refill” option in your [employer] account.  Refills are not addressed on weekends; covering physicians do not authorize routine medications on weekends.  No narcotics or controlled substances are refilled after noon on Fridays or by on call physicians.  By law, narcotics must be electronically prescribed.  A 30 day supply with no refills is the maximum allowed.  If your prescription is due for a refill, you may be due for a follow up appointment.  To best provide you care, patients receiving routine medications need to be seen at least once a year.  Patients receiving narcotic/controlled substance medications need to be seen at least once every 3 months.  In the event that your preferred pharmacy does not have the requested medication in stock (e.g. Backordered), it is your responsibility to find another pharmacy that has the requested medication available.  We will gladly send a new prescription to that pharmacy at your request.    Scheduling Tests:    If your physician has ordered radiology tests such as MRI or CT scans, please contact Central Scheduling at [phone] right away to schedule the test.  Once scheduled, the [facility]  Centralized Referral Team will work with your insurance carrier to obtain pre-certification or prior authorization.  Depending on your insurance carrier, approval may take 3-10 days.  It is highly recommended patients assure they have received an authorization before having a test performed.  If test is done without insurance authorization, patient may be responsible for the entire amount billed.      Precertification and Prior Authorizations:  If your physician has recommended that you have a procedure or additional testing performed the [facility] Centralized Referral Team will contact your insurance carrier to obtain pre-certification or prior authorization.    You are strongly encouraged to contact your insurance carrier to verify that your procedure/test has been approved and is a COVERED benefit.  Although the [facility] Centralized Referral Team does its due diligence, the insurance carrier gives the disclaimer that \"Although the procedure is authorized, this does not guarantee payment.\"    Ultimately the patient is responsible for payment.   Thank you for your understanding in this matter.  Paperwork Completion:  If you require FMLA or disability paperwork for your recovery, please make sure to either drop it off or have it faxed to our office at [phone]. Be sure the form has your name and date of birth on it.  The form will be faxed to our Forms Department and they will complete it for you.  There is a 25$ fee for all forms that need to be filled out.  Please be aware there is a 10-14 day turnaround time.  You will need to sign a release of information (PHILIPPE) form if your paperwork does not come with one.  You may call the Forms Department with any questions at [phone].  Their fax number is [phone].

## 2024-11-27 NOTE — PROGRESS NOTES
Neurology History & Physical     ASSESSMENT & PLAN:      ICD-10-CM    1. Focal epilepsy (HCC)  G40.109 lamoTRIgine 100 MG Oral Tab      2. Encounter for long-term (current) drug use  Z79.899 Lamotrigine (Lamictal), Serum      3. Acute repetitive seizure (HCC)  G40.909 lamoTRIgine 100 MG Oral Tab        New onset epilepsy, with focal seizures and risk of acute repetitive seizures, uncontrolled with ongoing auras.  Cont LTG titration and check level once on 100 BID x 2 weeks, and then consider tapering off OXC (cont OXC for now).      Reports minor STM issues as well.  May be worse with dual ASD + frequent auras.  CTM, hopefully will improve with better seizure control and monotherapy.     Return in about 3 weeks (around 12/18/2024).       ~~~~~~~~~~~~~~~~~~~~~~~~~~~    CHIEF COMPLAINT / REASON FOR VISIT:    Chief Complaint   Patient presents with    Seizures     Auras 4x per week     HISTORY OBTAINED FROM:  Patient and others as above  Chart review    HISTORY:  Missy Stuart is a 19 year old female with new events of very intense greer vu for the past week.  There is BUE numbness and sweaty, increasing frequency each day (had 7 yesterday).  Today at 1 am, she was with her mom and brother, and had another greer vu feeling, more intense and prolonged.  She had a bad taste in her mouth.  She felt the numbness, was washing her hands and face due to symptoms.  She could not get her words out, and then she began to spin in a [place].  Her mom hugged her, her head jerked upward and she had a generalized convulsion, foaming at the mouth.  EMS was called.  Shaking stopped in 4-5 minutes.  Was taken to ER here and evaluated.    Has been on prozac since May 2024, and remeron since July 2024.  She forgot to take both for 3 days (Fri-Sat-Sun).  On these for anxiety and insomnia.    INTERIM HISTORY:  Still having auras 3-4 times a week, lasting 30 seconds, except none this week (and this week she started -50).  No larger  seizures.  Fatigue improved.  Remains on  mg BID, still very tired from it.  Reports minor issues with STM in past few weeks.    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)    Epilepsy risk factors:  Normal birth and development   No febrile or childhood seizures   No meningitis/encephalitis  No head trauma with prolonged LOC/amnesia (did have a concussion at age 6 yrs, no LOC)  No known structural brain lesions  One cousin had a seizure    Previous medication trials:  none    DATA REVIEWED:  As documented in the history    Aug 2024  EEG normal  8/28/24 CBC, CMP unremarkable (Na 135), OXC level 20 (on 345 mg BID)  CBC, CMP unremarkable  MRI brain unremarkable (arachnoid cyst is not significant)    Head CT unremarkable (I independently analyzed and interpreted this, and I agree with the reading physician's report.)  ER note  CBC WBC 18.5  CMP Na 133, K 3.2, CO2 14.5    PHYSICAL EXAMINATION:  /72   Pulse 88   Resp 16   Wt 160 lb (72.6 kg)   LMP 07/10/2024 (Exact Date)   BMI 26.63 kg/m²     Gen: in NAD  MSE: nl attn/conc, nl language, nl fund of knowledge  CN: EOMI, VFF, nl facial mvmt, nl palate, nl tongue  Motor: 5/5 x4  DTR: 2+ BUE and BLE  Coord: nl FTN b/I  Gait: normal    No Known Allergies    Current medications:   lamoTRIgine 100 MG Oral Tab Take 1 tablet (100 mg total) by mouth 2 (two) times daily. 60 tablet 11    OXcarbazepine 150 MG Oral Tab Take 3 tablets (450 mg total) by mouth 2 (two) times daily. 180 tablet 11    Midazolam (NAYZILAM) 5 MG/0.1ML Nasal Solution 5 mg by Nasal route as needed (seizure with shaking greater than 5 minutes). 1 each 0    FLUoxetine 20 MG Oral Cap Take 2 capsules (40 mg total) by mouth every morning.         Past Medical History:    Anxiety    Hearing aid worn       Past Surgical History:   Procedure Laterality Date    Knee surgery Left 06/05/2024    ACL       Social History      Socioeconomic History    Marital status: Single   Tobacco Use    Smoking status: Never    Smokeless tobacco: Never   Vaping Use    Vaping status: Never Used   Substance and Sexual Activity    Alcohol use: No    Drug use: No    Sexual activity: Never   Other Topics Concern    Caffeine Concern No     Comment: 1 diet coke per day, occasionally coffee    Exercise Yes     Comment: walking       Family History   Problem Relation Age of Onset    Other (Other) Mother         thyroid    Other (RA) Maternal Grandmother     Hypertension Maternal Grandmother     Hypertension Maternal Grandfather     Lipids Maternal Grandfather     Heart Disorder Paternal Grandfather     Lipids Paternal Grandfather          Home Robbins MD, FAES, FAAN  Board-Certified in Neurology, Epilepsy, and Clinical Neurophysiology  [facility] [place]

## 2024-12-16 ENCOUNTER — PATIENT MESSAGE (OUTPATIENT)
Facility: CLINIC | Age: 19
End: 2024-12-16

## 2024-12-16 ENCOUNTER — LAB ENCOUNTER (OUTPATIENT)
Dept: LAB | Facility: HOSPITAL | Age: 19
End: 2024-12-16
Attending: Other
Payer: COMMERCIAL

## 2024-12-16 DIAGNOSIS — Z79.899 ENCOUNTER FOR LONG-TERM (CURRENT) DRUG USE: ICD-10-CM

## 2024-12-16 PROCEDURE — 36415 COLL VENOUS BLD VENIPUNCTURE: CPT

## 2024-12-16 PROCEDURE — 80175 DRUG SCREEN QUAN LAMOTRIGINE: CPT

## 2024-12-18 ENCOUNTER — OFFICE VISIT (OUTPATIENT)
Facility: CLINIC | Age: 19
End: 2024-12-18
Payer: COMMERCIAL

## 2024-12-18 VITALS — HEART RATE: 100 BPM | SYSTOLIC BLOOD PRESSURE: 110 MMHG | DIASTOLIC BLOOD PRESSURE: 66 MMHG | RESPIRATION RATE: 16 BRPM

## 2024-12-18 DIAGNOSIS — G40.109 FOCAL EPILEPSY (HCC): Primary | ICD-10-CM

## 2024-12-18 DIAGNOSIS — G40.909 ACUTE REPETITIVE SEIZURE (HCC): ICD-10-CM

## 2024-12-18 LAB — LAMOTRIGINE LVL: 5.2 UG/ML

## 2024-12-18 PROCEDURE — 99214 OFFICE O/P EST MOD 30 MIN: CPT | Performed by: OTHER

## 2024-12-18 NOTE — PROGRESS NOTES
Neurology History & Physical     ASSESSMENT & PLAN:      ICD-10-CM    1. Focal epilepsy (HCC)  G40.109 EEG      2. Acute repetitive seizure (HCC)  G40.909         Epilepsy with focal seizures and risk of acute repetitive seizures, uncontrolled with ongoing auras with new symptoms.  Obtain 48 hr aEEG.  LTG level appropriate, cont 100 mg BID and start tapering off OXC.    Reports minor STM issues as well.  May be worse with dual ASD + frequent auras.  CTM, hopefully will improve with better seizure control and monotherapy.     Return in about 3 weeks (around 1/8/2025).       ~~~~~~~~~~~~~~~~~~~~~~~~~~~    CHIEF COMPLAINT / REASON FOR VISIT:    Chief Complaint   Patient presents with    Seizures     Having hot flashing, dejavu episode. Has been happening a couple times per day, lasting up a minute. With parents for visit today.      HISTORY OBTAINED FROM:  Patient and others as above  Chart review    HISTORY:  Missy Stuart is a 19 year old female with new events of very intense greer vu for the past week.  There is BUE numbness and sweaty, increasing frequency each day (had 7 yesterday).  Today at 1 am, she was with her mom and brother, and had another greer vu feeling, more intense and prolonged.  She had a bad taste in her mouth.  She felt the numbness, was washing her hands and face due to symptoms.  She could not get her words out, and then she began to spin in a [place].  Her mom hugged her, her head jerked upward and she had a generalized convulsion, foaming at the mouth.  EMS was called.  Shaking stopped in 4-5 minutes.  Was taken to ER here and evaluated.    Has been on prozac since May 2024, and remeron since July 2024.  She forgot to take both for 3 days (Fri-Sat-Sun).  On these for anxiety and insomnia.    Epilepsy risk factors:  Normal birth and development   No febrile or childhood seizures   No meningitis/encephalitis  No head trauma with prolonged LOC/amnesia (did have a concussion at age 6 yrs, no LOC)  No  known structural brain lesions  One cousin had a seizure    Previous medication trials:  None    INTERIM HISTORY:  Rash around neck resolved - she had called me day of last visit with a rash that occurred in setting of a new necklace.    Now on  mg BID, and  mg BID.    Having a new symptom starting about 2 weeks ago - hot flashes with greer vu and some dissociation, lasting 1 minute, but no altered awareness.  She needs to sit down.  Can happen 2-4 times a day.    Is slightly less tired with LTG addition.  Ongoing minor issues with STM.    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[employer])    DATA REVIEWED:  As documented in the history    Dec 2024  LTG 5.2    Aug 2024  EEG normal  8/28/24 CBC, CMP unremarkable (Na 135), OXC level 20 (on 345 mg BID)  CBC, CMP unremarkable  MRI brain unremarkable (arachnoid cyst is not significant)    Head CT unremarkable (I independently analyzed and interpreted this, and I agree with the reading physician's report.)  ER note  CBC WBC 18.5  CMP Na 133, K 3.2, CO2 14.5    PHYSICAL EXAMINATION:  /66   Pulse 100   Resp 16   LMP 07/10/2024 (Exact Date)     Gen: in NAD  MSE: nl attn/conc, nl language, nl fund of knowledge  CN: EOMI, VFF, nl facial mvmt, nl palate, nl tongue  Motor: 5/5 x4  DTR: 2+ BUE and BLE  Coord: nl FTN b/I  Gait: normal    No Known Allergies    Current medications:   lamoTRIgine 100 MG Oral Tab Take 1 tablet (100 mg total) by mouth 2 (two) times daily. 60 tablet 11    OXcarbazepine 150 MG Oral Tab Take 3 tablets (450 mg total) by mouth 2 (two) times daily. 180 tablet 11    Midazolam (NAYZILAM) 5 MG/0.1ML Nasal Solution 5 mg by Nasal route as needed (seizure with shaking greater than 5 minutes). 1 each 0    FLUoxetine 20 MG Oral Cap Take 2 capsules (40 mg total) by mouth every morning.         Past Medical History:    Anxiety    Hearing aid worn       Past Surgical  History:   Procedure Laterality Date    Knee surgery Left 06/05/2024    ACL       Social History     Socioeconomic History    Marital status: Single   Tobacco Use    Smoking status: Never    Smokeless tobacco: Never   Vaping Use    Vaping status: Never Used   Substance and Sexual Activity    Alcohol use: No    Drug use: No    Sexual activity: Never   Other Topics Concern    Caffeine Concern No     Comment: 1 diet coke per day, occasionally coffee    Exercise Yes     Comment: walking       Family History   Problem Relation Age of Onset    Other (Other) Mother         thyroid    Other (RA) Maternal Grandmother     Hypertension Maternal Grandmother     Hypertension Maternal Grandfather     Lipids Maternal Grandfather     Heart Disorder Paternal Grandfather     Lipids Paternal Grandfather          Home Robbins MD, FAES, FAAN  Board-Certified in Neurology, Epilepsy, and Clinical Neurophysiology  [facility] [place]

## 2024-12-18 NOTE — PATIENT INSTRUCTIONS
Instructions from Dr. Robbins:       Decrease oxcarbazepine as follows:  Week 1 - 300 mg twice a day  Week 2 - 150 mg twice a day  Week 3 - stop completely    Continue lamotrigine 100 mg twice daily (no change)    ~~~~~~~~~~~~~~~~~~~~~~~     After your visit at the [facility]  today,  please direct any follow up questions or medication needs to the staff in our  [place] office so that your concerns may be promptly addressed.  We are available through [employer] or at the numbers below:    The phone number is:   [phone] option #1    The fax number is:  [phone]    Your pharmacy should also send any requests electronically to the [place] office.     .Refill policies:    Allow 2-3 business days for refills; controlled substances may take longer.  Contact your pharmacy at least 5 days prior to running out of medication and have them send an electronic request or submit request through the “request refill” option in your [employer] account.  Refills are not addressed on weekends; covering physicians do not authorize routine medications on weekends.  No narcotics or controlled substances are refilled after noon on Fridays or by on call physicians.  By law, narcotics must be electronically prescribed.  A 30 day supply with no refills is the maximum allowed.  If your prescription is due for a refill, you may be due for a follow up appointment.  To best provide you care, patients receiving routine medications need to be seen at least once a year.  Patients receiving narcotic/controlled substance medications need to be seen at least once every 3 months.  In the event that your preferred pharmacy does not have the requested medication in stock (e.g. Backordered), it is your responsibility to find another pharmacy that has the requested medication available.  We will gladly send a new prescription to that pharmacy at your request.    Scheduling Tests:    If your physician has ordered radiology tests such  as MRI or CT scans, please contact Central Scheduling at [phone] right away to schedule the test.  Once scheduled, the [facility] Centralized Referral Team will work with your insurance carrier to obtain pre-certification or prior authorization.  Depending on your insurance carrier, approval may take 3-10 days.  It is highly recommended patients assure they have received an authorization before having a test performed.  If test is done without insurance authorization, patient may be responsible for the entire amount billed.      Precertification and Prior Authorizations:  If your physician has recommended that you have a procedure or additional testing performed the [facility] Centralized Referral Team will contact your insurance carrier to obtain pre-certification or prior authorization.    You are strongly encouraged to contact your insurance carrier to verify that your procedure/test has been approved and is a COVERED benefit.  Although the [facility] Centralized Referral Team does its due diligence, the insurance carrier gives the disclaimer that \"Although the procedure is authorized, this does not guarantee payment.\"    Ultimately the patient is responsible for payment.   Thank you for your understanding in this matter.  Paperwork Completion:  If you require FMLA or disability paperwork for your recovery, please make sure to either drop it off or have it faxed to our office at [phone]. Be sure the form has your name and date of birth on it.  The form will be faxed to our Forms Department and they will complete it for you.  There is a 25$ fee for all forms that need to be filled out.  Please be aware there is a 10-14 day turnaround time.  You will need to sign a release of information (PHILIPPE) form if your paperwork does not come with one.  You may call the Forms Department with any questions at [phone].  Their fax number is [phone].

## 2024-12-20 ENCOUNTER — NURSE ONLY (OUTPATIENT)
Dept: ELECTROPHYSIOLOGY | Facility: HOSPITAL | Age: 19
End: 2024-12-20
Attending: Other
Payer: COMMERCIAL

## 2024-12-20 PROCEDURE — 95719 EEG PHYS/QHP EA INCR W/O VID: CPT

## 2024-12-20 PROCEDURE — 95709 EEG W/O VID EA 12-26HR INTMT: CPT

## 2024-12-20 PROCEDURE — 95700 EEG CONT REC W/VID EEG TECH: CPT

## 2024-12-20 NOTE — TELEPHONE ENCOUNTER
Paged by patients mother re questions on when to push button on amb eeg, discussed. Madelaine Sevilla, DO

## 2024-12-21 ENCOUNTER — NURSE ONLY (OUTPATIENT)
Dept: ELECTROPHYSIOLOGY | Facility: HOSPITAL | Age: 19
End: 2024-12-21
Attending: Other
Payer: COMMERCIAL

## 2024-12-21 PROCEDURE — 95708 EEG WO VID EA 12-26HR UNMNTR: CPT

## 2024-12-21 PROCEDURE — 95719 EEG PHYS/QHP EA INCR W/O VID: CPT

## 2024-12-22 ENCOUNTER — NURSE ONLY (OUTPATIENT)
Dept: ELECTROPHYSIOLOGY | Facility: HOSPITAL | Age: 19
End: 2024-12-22
Attending: Other
Payer: COMMERCIAL

## 2025-01-08 ENCOUNTER — OFFICE VISIT (OUTPATIENT)
Facility: CLINIC | Age: 20
End: 2025-01-08
Payer: COMMERCIAL

## 2025-01-08 VITALS — SYSTOLIC BLOOD PRESSURE: 118 MMHG | DIASTOLIC BLOOD PRESSURE: 68 MMHG | HEART RATE: 80 BPM | RESPIRATION RATE: 16 BRPM

## 2025-01-08 DIAGNOSIS — R41.3 MEMORY LOSS: ICD-10-CM

## 2025-01-08 DIAGNOSIS — G40.109 FOCAL EPILEPSY (HCC): Primary | ICD-10-CM

## 2025-01-08 PROCEDURE — 99214 OFFICE O/P EST MOD 30 MIN: CPT | Performed by: OTHER

## 2025-01-08 RX ORDER — LAMOTRIGINE 100 MG/1
100 TABLET ORAL 2 TIMES DAILY
Qty: 60 TABLET | Refills: 11 | Status: SHIPPED | OUTPATIENT
Start: 2025-01-08

## 2025-01-08 NOTE — PATIENT INSTRUCTIONS
After your visit at the [facility] office  today,  please direct any follow up questions or medication needs to the staff in our  [place] office so that your concerns may be promptly addressed.  We are available through [employer] or at the numbers below:    The phone number is:   [phone] option #1    The fax number is:  [phone]    Your pharmacy should also send any requests electronically to the [place] office.     Refill policies:    Allow 2-3 business days for refills; controlled substances may take longer.  Contact your pharmacy at least 5 days prior to running out of medication and have them send an electronic request or submit request through the “request refill” option in your [employer] account.  Refills are not addressed on weekends; covering physicians do not authorize routine medications on weekends.  No narcotics or controlled substances are refilled after noon on Fridays or by on call physicians.  By law, narcotics must be electronically prescribed.  A 30 day supply with no refills is the maximum allowed.  If your prescription is due for a refill, you may be due for a follow up appointment.  To best provide you care, patients receiving routine medications need to be seen at least once a year.  Patients receiving narcotic/controlled substance medications need to be seen at least once every 3 months.  In the event that your preferred pharmacy does not have the requested medication in stock (e.g. Backordered), it is your responsibility to find another pharmacy that has the requested medication available.  We will gladly send a new prescription to that pharmacy at your request.    Scheduling Tests:    If your physician has ordered radiology tests such as MRI or CT scans, please contact Central Scheduling at [phone] right away to schedule the test.  Once scheduled, the [facility] Centralized Referral Team will work with your insurance carrier to obtain pre-certification or prior  authorization.  Depending on your insurance carrier, approval may take 3-10 days.  It is highly recommended patients assure they have received an authorization before having a test performed.  If test is done without insurance authorization, patient may be responsible for the entire amount billed.      Precertification and Prior Authorizations:  If your physician has recommended that you have a procedure or additional testing performed the [facility] Centralized Referral Team will contact your insurance carrier to obtain pre-certification or prior authorization.    You are strongly encouraged to contact your insurance carrier to verify that your procedure/test has been approved and is a COVERED benefit.  Although the [facility] Centralized Referral Team does its due diligence, the insurance carrier gives the disclaimer that \"Although the procedure is authorized, this does not guarantee payment.\"    Ultimately the patient is responsible for payment.   Thank you for your understanding in this matter.  Paperwork Completion:  If you require FMLA or disability paperwork for your recovery, please make sure to either drop it off or have it faxed to our office at [phone]. Be sure the form has your name and date of birth on it.  The form will be faxed to our Forms Department and they will complete it for you.  There is a 25$ fee for all forms that need to be filled out.  Please be aware there is a 10-14 day turnaround time.  You will need to sign a release of information (PHILIPPE) form if your paperwork does not come with one.  You may call the Forms Department with any questions at [phone].  Their fax number is [phone].

## 2025-01-08 NOTE — PROGRESS NOTES
Neurology History & Physical     ASSESSMENT & PLAN:      ICD-10-CM    1. Focal epilepsy (HCC)  G40.109 lamoTRIgine 100 MG Oral Tab      2. Memory loss  R41.3 PSYCHOLOGY - INTERNAL        Epilepsy with focal seizures and risk of acute repetitive seizures, improving but not to goal.  Side effects improved off OXC.  Per aEEG, I do not believe the new staring events are necessarily seizures (merly given duration of 1 minute or longer).  Continue LTG unchanged.    Ongoing STM issues.  Obtain neuropsych testing.    Return in about 3 months (around 4/8/2025).       ~~~~~~~~~~~~~~~~~~~~~~~~~~~    CHIEF COMPLAINT / REASON FOR VISIT:    Chief Complaint   Patient presents with    Seizures     Better since last visit. With parents for visit today.      HISTORY OBTAINED FROM:  Patient and others as above  Chart review    HISTORY:  Missy Stuart is a 19 year old female with new events of very intense greer vu for the past week.  There is BUE numbness and sweaty, increasing frequency each day (had 7 yesterday).  Today at 1 am, she was with her mom and brother, and had another greer vu feeling, more intense and prolonged.  She had a bad taste in her mouth.  She felt the numbness, was washing her hands and face due to symptoms.  She could not get her words out, and then she began to spin in a [place].  Her mom hugged her, her head jerked upward and she had a generalized convulsion, foaming at the mouth.  EMS was called.  Shaking stopped in 4-5 minutes.  Was taken to ER here and evaluated.    Has been on prozac since May 2024, and remeron since July 2024.  She forgot to take both for 3 days (Fri-Sat-Sun).  On these for anxiety and insomnia.    Epilepsy risk factors:  Normal birth and development   No febrile or childhood seizures   No meningitis/encephalitis  No head trauma with prolonged LOC/amnesia (did have a concussion at age 6 yrs, no LOC)  No known structural brain lesions  One cousin had a seizure    Previous medication  trials:  None    INTERIM HISTORY:  Had brief 20-30 seconds staring events during EEG (these have not recurred).  Off OXC, tiredness is much improved.  No further greer vu or dissociative events.  Remains on  mg BID.  Ongoing minor issues with STM.  Gong back to school next week.    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)    DATA REVIEWED:  As documented in the history    Dec 2024  LTG 5.2  aEEG 46 hr (read by me) - normal, no correlate to 6 events of staring    Aug 2024  EEG normal  8/28/24 CBC, CMP unremarkable (Na 135), OXC level 20 (on 345 mg BID)  CBC, CMP unremarkable  MRI brain unremarkable (arachnoid cyst is not significant)    Head CT unremarkable (I independently analyzed and interpreted this, and I agree with the reading physician's report.)  ER note  CBC WBC 18.5  CMP Na 133, K 3.2, CO2 14.5    PHYSICAL EXAMINATION:  /68   Pulse 80   Resp 16   LMP 07/10/2024 (Exact Date)     Gen: in NAD  MSE: nl attn/conc, nl language, nl fund of knowledge  CN: EOMI, VFF, nl facial mvmt, nl palate, nl tongue  Motor: 5/5 x4  DTR: 2+ BUE and BLE  Coord: nl FTN b/I  Gait: normal    No Known Allergies    Current medications:   lamoTRIgine 100 MG Oral Tab Take 1 tablet (100 mg total) by mouth 2 (two) times daily. 60 tablet 11    Midazolam (NAYZILAM) 5 MG/0.1ML Nasal Solution 5 mg by Nasal route as needed (seizure with shaking greater than 5 minutes). 1 each 0    FLUoxetine 20 MG Oral Cap Take 2 capsules (40 mg total) by mouth every morning.         Past Medical History:    Anxiety    Hearing aid worn       Past Surgical History:   Procedure Laterality Date    Knee surgery Left 06/05/2024    ACL       Social History     Socioeconomic History    Marital status: Single   Tobacco Use    Smoking status: Never    Smokeless tobacco: Never   Vaping Use    Vaping status: Never Used   Substance and Sexual Activity    Alcohol use: No     Drug use: No    Sexual activity: Never   Other Topics Concern    Caffeine Concern No     Comment: 1 diet coke per day, occasionally coffee    Exercise Yes     Comment: walking       Family History   Problem Relation Age of Onset    Other (Other) Mother         thyroid    Other (RA) Maternal Grandmother     Hypertension Maternal Grandmother     Hypertension Maternal Grandfather     Lipids Maternal Grandfather     Heart Disorder Paternal Grandfather     Lipids Paternal Grandfather          Home Robbins MD, FAES, FAAN  Board-Certified in Neurology, Epilepsy, and Clinical Neurophysiology  [facility]

## 2025-02-20 ENCOUNTER — PATIENT MESSAGE (OUTPATIENT)
Facility: CLINIC | Age: 20
End: 2025-02-20

## 2025-03-03 ENCOUNTER — OFFICE VISIT (OUTPATIENT)
Facility: CLINIC | Age: 20
End: 2025-03-03
Payer: COMMERCIAL

## 2025-03-03 ENCOUNTER — LAB ENCOUNTER (OUTPATIENT)
Dept: LAB | Age: 20
End: 2025-03-03
Attending: Other
Payer: COMMERCIAL

## 2025-03-03 VITALS
WEIGHT: 160 LBS | BODY MASS INDEX: 27 KG/M2 | DIASTOLIC BLOOD PRESSURE: 64 MMHG | HEART RATE: 68 BPM | RESPIRATION RATE: 16 BRPM | SYSTOLIC BLOOD PRESSURE: 108 MMHG

## 2025-03-03 DIAGNOSIS — Z79.899 ENCOUNTER FOR LONG-TERM (CURRENT) DRUG USE: Primary | ICD-10-CM

## 2025-03-03 DIAGNOSIS — Z79.899 ENCOUNTER FOR LONG-TERM (CURRENT) DRUG USE: ICD-10-CM

## 2025-03-03 DIAGNOSIS — G40.109 FOCAL EPILEPSY (HCC): ICD-10-CM

## 2025-03-03 PROCEDURE — 80175 DRUG SCREEN QUAN LAMOTRIGINE: CPT

## 2025-03-03 PROCEDURE — 36415 COLL VENOUS BLD VENIPUNCTURE: CPT

## 2025-03-03 PROCEDURE — 99214 OFFICE O/P EST MOD 30 MIN: CPT | Performed by: OTHER

## 2025-03-03 RX ORDER — LAMOTRIGINE 100 MG/1
150 TABLET ORAL 2 TIMES DAILY
Qty: 270 TABLET | Refills: 3 | Status: SHIPPED | OUTPATIENT
Start: 2025-03-03 | End: 2026-02-26

## 2025-03-03 NOTE — PROGRESS NOTES
Neurology History & Physical     ASSESSMENT & PLAN:      ICD-10-CM    1. Encounter for long-term (current) drug use  Z79.899       2. Focal epilepsy ([facility])  G40.109         Epilepsy with focal seizures and risk of acute repetitive seizures, improving but not to goal.  Side effects improved off OXC.  Per aEEG, I do not believe the new staring events are necessarily seizures (merly given duration of 1 minute or longer).  Continue  mg BID, check level today.  She may drive as seizures are focal aware.    I advised I have no contraindication to flying to [place] - though she would be at risk for seizures, and needs to be careful with jet lag, sleep deprivation, adherence q12h regardless of time zone.  She is taking rescue medication with her.  I advised melatonin is ok but wouldn't recommend xanax for sleep.    Ongoing STM issues.  Obtain neuropsych testing.    Return in about 3 months (around 6/3/2025).       ~~~~~~~~~~~~~~~~~~~~~~~~~~~    CHIEF COMPLAINT / REASON FOR VISIT:    Chief Complaint   Patient presents with    Seizures     HISTORY OBTAINED FROM:  Patient and others as above  Chart review    HISTORY:  Missy Stuart is a 19 year old female with new events of very intense greer vu for the past week.  There is BUE numbness and sweaty, increasing frequency each day (had 7 yesterday).  Today at 1 am, she was with her mom and brother, and had another greer vu feeling, more intense and prolonged.  She had a bad taste in her mouth.  She felt the numbness, was washing her hands and face due to symptoms.  She could not get her words out, and then she began to spin in a [place].  Her mom hugged her, her head jerked upward and she had a generalized convulsion, foaming at the mouth.  EMS was called.  Shaking stopped in 4-5 minutes.  Was taken to ER here and evaluated.    Had been on prozac since May 2024, and remeron since July 2024.  She forgot to take both for 3 days (Fri-Sat-Sun).  On these for anxiety and  insomnia.    Had brief 20-30 seconds staring events during EEG (these have not recurred, and were without EEG correlate).  Off OXC, tiredness is much improved.    Also has STM issues ongoing.    Epilepsy risk factors:  Normal birth and development   No febrile or childhood seizures   No meningitis/encephalitis  No head trauma with prolonged LOC/amnesia (did have a concussion at age 6 yrs, no LOC)  No known structural brain lesions  One cousin had a seizure    Previous medication trials:  None    INTERIM HISTORY:  Focal aware seizures (greer vu, hot flashes, warm and dizzy x 30-45 seconds; no altered awareness) returned mid Jan 2025, LTG was increased to 150 mg BID.  Has some tiredness but able to function well throughout the day.  Seizures improved - last one two weeks ago, probably due to missed doses.  She now has a box to help adherence.    Back to school (2 online classes, living in [place]), will probably have to add a ninth semester.  Planning 2 week trip to [place] (leaving 3/13/24) - going alone but staying with her friend.    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[employer])    DATA REVIEWED:  As documented in the history    Dec 2024  LTG 5.2  aEEG 46 hr (read by me) - normal, no correlate to 6 events of staring    Aug 2024  EEG normal  8/28/24 CBC, CMP unremarkable (Na 135), OXC level 20 (on 345 mg BID)  CBC, CMP unremarkable  MRI brain unremarkable (arachnoid cyst is not significant)    Head CT unremarkable (I independently analyzed and interpreted this, and I agree with the reading physician's report.)  ER note  CBC WBC 18.5  CMP Na 133, K 3.2, CO2 14.5    PHYSICAL EXAMINATION:  /64 (BP Location: Right arm, Patient Position: Sitting, Cuff Size: adult)   Pulse 68   Resp 16   Wt 160 lb (72.6 kg)   LMP 07/10/2024 (Exact Date)   BMI 26.63 kg/m²     Gen: in NAD  MSE: nl attn/conc, nl language, nl fund of knowledge  CN: EOMI, VFF, nl facial mvmt, nl  palate, nl tongue  Motor: 5/5 x4  DTR: 2+ BUE and BLE  Coord: nl FTN b/I  Gait: normal    No Known Allergies    Current medications:   lamoTRIgine 100 MG Oral Tab Take 1 tablet (100 mg total) by mouth 2 (two) times daily. (Patient taking differently: Take 1.5 tablets (150 mg total) by mouth 2 (two) times daily.) 60 tablet 11    Midazolam (NAYZILAM) 5 MG/0.1ML Nasal Solution 5 mg by Nasal route as needed (seizure with shaking greater than 5 minutes). 1 each 0    FLUoxetine 20 MG Oral Cap Take 2 capsules (40 mg total) by mouth every morning.         Past Medical History:    Anxiety    Epilepsy (HCC)    Hearing aid worn       Past Surgical History:   Procedure Laterality Date    Knee surgery Left 06/05/2024    ACL       Social History     Socioeconomic History    Marital status: Single   Tobacco Use    Smoking status: Never    Smokeless tobacco: Never   Vaping Use    Vaping status: Never Used   Substance and Sexual Activity    Alcohol use: No    Drug use: No    Sexual activity: Never   Other Topics Concern    Caffeine Concern No    Stress Concern No    Special Diet No    Exercise Yes     Comment: 4 per week    Seat Belt Yes       Family History   Problem Relation Age of Onset    Other (Other) Mother         thyroid    Other (RA) Maternal Grandmother     Hypertension Maternal Grandmother     Hypertension Maternal Grandfather     Lipids Maternal Grandfather     Heart Disorder Paternal Grandfather     Lipids Paternal Grandfather          Home Robbins MD, FAES, FAAN  Board-Certified in Neurology, Epilepsy, and Clinical Neurophysiology  [facility]

## 2025-03-03 NOTE — PATIENT INSTRUCTIONS
Refill policies:    Allow 2-3 business days for refills; controlled substances may take longer.  Contact your pharmacy at least 5 days prior to running out of medication and have them send an electronic request or submit request through the “request refill” option in your [employer] account.  Refills are not addressed on weekends; covering physicians do not authorize routine medications on weekends.  No narcotics or controlled substances are refilled after noon on Fridays or by on call physicians.  By law, narcotics must be electronically prescribed.  A 30 day supply with no refills is the maximum allowed.  If your prescription is due for a refill, you may be due for a follow up appointment.  To best provide you care, patients receiving routine medications need to be seen at least once a year.  Patients receiving narcotic/controlled substance medications need to be seen at least once every 3 months.  In the event that your preferred pharmacy does not have the requested medication in stock (e.g. Backordered), it is your responsibility to find another pharmacy that has the requested medication available.  We will gladly send a new prescription to that pharmacy at your request.    Scheduling Tests:    If your physician has ordered radiology tests such as MRI or CT scans, please contact Central Scheduling at [phone] right away to schedule the test.  Once scheduled, the [facility] Centralized Referral Team will work with your insurance carrier to obtain pre-certification or prior authorization.  Depending on your insurance carrier, approval may take 3-10 days.  It is highly recommended patients assure they have received an authorization before having a test performed.  If test is done without insurance authorization, patient may be responsible for the entire amount billed.      Precertification and Prior Authorizations:  If your physician has recommended that you have a procedure or additional testing performed the [facility]  Centralized Referral Team will contact your insurance carrier to obtain pre-certification or prior authorization.    You are strongly encouraged to contact your insurance carrier to verify that your procedure/test has been approved and is a COVERED benefit.  Although the [facility] Centralized Referral Team does its due diligence, the insurance carrier gives the disclaimer that \"Although the procedure is authorized, this does not guarantee payment.\"    Ultimately the patient is responsible for payment.   Thank you for your understanding in this matter.  Paperwork Completion:  If you require FMLA or disability paperwork for your recovery, please make sure to either drop it off or have it faxed to our office at [phone]. Be sure the form has your name and date of birth on it.  The form will be faxed to our Forms Department and they will complete it for you.  There is a 25$ fee for all forms that need to be filled out.  Please be aware there is a 10-14 day turnaround time.  You will need to sign a release of information (PHILIPPE) form if your paperwork does not come with one.  You may call the Forms Department with any questions at [phone].  Their fax number is [phone].

## 2025-03-06 LAB — LAMOTRIGINE LVL: 8.7 UG/ML

## 2025-03-07 ENCOUNTER — PATIENT MESSAGE (OUTPATIENT)
Facility: CLINIC | Age: 20
End: 2025-03-07

## 2025-03-07 DIAGNOSIS — G40.909 ACUTE REPETITIVE SEIZURE (HCC): ICD-10-CM

## 2025-03-07 RX ORDER — MIDAZOLAM 5 MG/.1ML
5 SPRAY NASAL AS NEEDED
Qty: 1 EACH | Refills: 0 | Status: SHIPPED | OUTPATIENT
Start: 2025-03-07

## 2025-03-07 RX ORDER — MIDAZOLAM 5 MG/.1ML
5 SPRAY NASAL AS NEEDED
Qty: 1 EACH | Refills: 0 | Status: CANCELLED | OUTPATIENT
Start: 2025-03-07

## 2025-03-07 NOTE — TELEPHONE ENCOUNTER
Medication: nayzilam/midazolam     Date of last refill: 11/12/2024 (#1/0)  [place]     Last office visit: 03/03/2025  Due back to clinic per last office note:  3 months  Date next office visit scheduled:    No future appointments.        Last OV note recommendation:    Epilepsy with focal seizures and risk of acute repetitive seizures, improving but not to goal.  Side effects improved off OXC.  Per aEEG, I do not believe the new staring events are necessarily seizures (merly given duration of 1 minute or longer).  Continue  mg BID, check level today.  She may drive as seizures are focal aware.     I advised I have no contraindication to flying to [place] - though she would be at risk for seizures, and needs to be careful with jet lag, sleep deprivation, adherence q12h regardless of time zone.  She is taking rescue medication with her.  I advised melatonin is ok but wouldn't recommend xanax for sleep.     Ongoing STM issues.  Obtain neuropsych testing.     Return in about 3 months (around 6/3/2025).

## 2025-03-07 NOTE — TELEPHONE ENCOUNTER
Home Robbins MD to Debbi Duvall Nurse       3/7/25 11:35 AM  Levels are appropriate, no changes recommended.

## 2025-03-10 ENCOUNTER — OFFICE VISIT (OUTPATIENT)
Dept: OTOLARYNGOLOGY | Facility: CLINIC | Age: 20
End: 2025-03-10
Payer: COMMERCIAL

## 2025-03-10 DIAGNOSIS — Q18.1 CYST OF EAR CANAL: Primary | ICD-10-CM

## 2025-03-10 DIAGNOSIS — H90.3 SENSORINEURAL HEARING LOSS (SNHL) OF BOTH EARS: ICD-10-CM

## 2025-03-10 PROCEDURE — 99213 OFFICE O/P EST LOW 20 MIN: CPT | Performed by: STUDENT IN AN ORGANIZED HEALTH CARE EDUCATION/TRAINING PROGRAM

## 2025-03-10 RX ORDER — OFLOXACIN 3 MG/ML
SOLUTION/ DROPS OPHTHALMIC
Qty: 5 ML | Refills: 0 | Status: SHIPPED | OUTPATIENT
Start: 2025-03-10

## 2025-03-10 RX ORDER — BETAMETHASONE DIPROPIONATE 0.5 MG/G
OINTMENT, AUGMENTED TOPICAL
Qty: 15 G | Refills: 0 | Status: SHIPPED | OUTPATIENT
Start: 2025-03-10

## 2025-03-10 NOTE — PROGRESS NOTES
Missy Stuart is a 19 year old female.   Chief Complaint   Patient presents with    Ear Problem     Patient is here due to  cyst in left  ear, reports when pressure is applied.     HPI:   19-year-old she has a history of hearing loss and epilepsy presents for an upcoming trip for tenderness of a lesion of her external ear canal on the left    Current Outpatient Medications   Medication Sig Dispense Refill    amoxicillin clavulanate 875-125 MG Oral Tab Take 1 tablet by mouth every 12 (twelve) hours for 7 days. 14 tablet 0    Betamethasone Dipropionate Aug 0.05 % External Ointment Apply by topical route twice every day to the affected area(s) ; do not exceed 45 grams per week. 15 g 0    ofloxacin 0.3 % Ophthalmic Solution Apply to affected ear 5 drops twice a day x 7 days 5 mL 0    Midazolam (NAYZILAM) 5 MG/0.1ML Nasal Solution 5 mg by Nasal route as needed (seizure with shaking greater than 5 minutes). 1 each 0    lamoTRIgine 100 MG Oral Tab Take 1.5 tablets (150 mg total) by mouth 2 (two) times daily. 270 tablet 3    FLUoxetine 20 MG Oral Cap Take 2 capsules (40 mg total) by mouth every morning.        Past Medical History:    Anxiety    Epilepsy (HCC)    Hearing aid worn      Social History:  Social History     Socioeconomic History    Marital status: Single   Tobacco Use    Smoking status: Never    Smokeless tobacco: Never   Vaping Use    Vaping status: Never Used   Substance and Sexual Activity    Alcohol use: No    Drug use: No    Sexual activity: Never   Other Topics Concern    Caffeine Concern No    Stress Concern No    Special Diet No    Exercise Yes     Comment: 4 per week    Seat Belt Yes      Past Surgical History:   Procedure Laterality Date    Knee surgery Left 06/05/2024    ACL         EXAM:   LMP 07/10/2024 (Exact Date)     System Details   Skin Inspection - Normal.   Constitutional Overall appearance - Normal.   Head/Face Symmetric, TMJ tenderness not present    Eyes EOMI, PERRL   Right ear:   Canal clear, TM intact, no KANA   Left ear:  Canal clear, TM intact, no KANA  Near the entrance of her left ear canal there is a small slightly inflamed cyst like structure or folliculitis   Nose: Septum midline, inferior turbinates not enlarged, nasal valves without collapse    Oral cavity/Oropharynx: No lesions or masses on inspection or palpation, tonsils symmetric    Neck: Soft without LAD, thyroid not enlarged  Voice clear/ no stridor   Other:      SCOPES AND PROCEDURES:         AUDIOGRAM AND IMAGING:         IMPRESSION:   1. Cyst of ear canal    2. Sensorineural hearing loss (SNHL) of both ears       Recommendations:  -Possible small area of folliculitis or inflamed gland of her left external auditory canal.  On the smaller side slightly tender possibly contributed by her hearing aid use irritating the area  -Will prescribe a topical steroid ointment and also for her to have on hand will prescribe a course of oral Augmentin which she was given for a similar issue in 2022 which helped.  Discussed she may try the ointment first and trying to keep the hearing aid off in the area and using the ointment twice a day and if still not improving consider the antibiotic which she is going to bring with her on her trip  -Also prescribe a refill of otic drops as needed for otorrhea in the setting of hearing aid use    The patient indicates understanding of these issues and agrees to the plan.      Herb Fatima MD  3/10/2025  2:37 PM

## 2025-03-12 ENCOUNTER — TELEPHONE (OUTPATIENT)
Dept: OTOLARYNGOLOGY | Facility: CLINIC | Age: 20
End: 2025-03-12

## 2025-04-28 NOTE — TELEPHONE ENCOUNTER
LOV:7/17/2024   for: [facility]   Patient advised to RTC on:  CPX in 1 year   Medication Quantity Refills Start End   FLUoxetine 20 MG Oral Cap -- -- 7/14/2024 --   Sig:   Take 2 capsules (40 mg total) by mouth every morning.

## 2025-05-02 DIAGNOSIS — G40.109 FOCAL EPILEPSY (HCC): ICD-10-CM

## 2025-05-02 NOTE — TELEPHONE ENCOUNTER
Spoke to the pharmacy who states that they are able to transfer the prescription over. Medication will be ready today. Will advise the patient.         Medication: lamoTRIgine 100 MG Oral Tab      Date of last refill: 03/03/2025 (#270/3)  [place]     Last office visit: 03/03/2025  Due back to clinic per last office note:  Around 06/03/2025  Date next office visit scheduled:    Future Appointments   Date Time Provider Department Center   6/23/2025  2:40 PM Home Robbins MD [facility]   7/16/2025  2:00 PM Home Robbins MD [facility]           Last OV note recommendation:    ASSESSMENT & PLAN:         ICD-10-CM     1. Encounter for long-term (current) drug use  Z79.899         2. Focal epilepsy (HCC)  G40.109            Epilepsy with focal seizures and risk of acute repetitive seizures, improving but not to goal.  Side effects improved off OXC.  Per aEEG, I do not believe the new staring events are necessarily seizures (merly given duration of 1 minute or longer).  Continue  mg BID, check level today.  She may drive as seizures are focal aware.     I advised I have no contraindication to flying to [place] - though she would be at risk for seizures, and needs to be careful with jet lag, sleep deprivation, adherence q12h regardless of time zone.  She is taking rescue medication with her.  I advised melatonin is ok but wouldn't recommend xanax for sleep.     Ongoing STM issues.  Obtain neuropsych testing.     Return in about 3 months (around 6/3/2025).

## 2025-05-05 RX ORDER — LAMOTRIGINE 100 MG/1
150 TABLET ORAL 2 TIMES DAILY
Qty: 270 TABLET | Refills: 3 | OUTPATIENT
Start: 2025-05-05 | End: 2026-04-30

## 2025-06-23 ENCOUNTER — OFFICE VISIT (OUTPATIENT)
Facility: CLINIC | Age: 20
End: 2025-06-23
Payer: COMMERCIAL

## 2025-06-23 VITALS — HEART RATE: 84 BPM | DIASTOLIC BLOOD PRESSURE: 68 MMHG | RESPIRATION RATE: 16 BRPM | SYSTOLIC BLOOD PRESSURE: 114 MMHG

## 2025-06-23 DIAGNOSIS — R41.3 MEMORY LOSS: ICD-10-CM

## 2025-06-23 DIAGNOSIS — G40.109 FOCAL EPILEPSY (HCC): Primary | ICD-10-CM

## 2025-06-23 DIAGNOSIS — Z79.899 ENCOUNTER FOR LONG-TERM (CURRENT) DRUG USE: ICD-10-CM

## 2025-06-23 PROCEDURE — 99214 OFFICE O/P EST MOD 30 MIN: CPT | Performed by: OTHER

## 2025-06-23 RX ORDER — LAMOTRIGINE 100 MG/1
250 TABLET ORAL 2 TIMES DAILY
Qty: 150 TABLET | Refills: 11 | Status: SHIPPED | OUTPATIENT
Start: 2025-06-23 | End: 2026-06-18

## 2025-06-23 RX ORDER — ALPRAZOLAM 0.25 MG
0.25 TABLET ORAL DAILY PRN
COMMUNITY
Start: 2025-03-11

## 2025-06-23 NOTE — PROGRESS NOTES
Neurology History & Physical     ASSESSMENT & PLAN:      ICD-10-CM    1. Focal epilepsy (HCC)  G40.109 lamoTRIgine 100 MG Oral Tab      2. Encounter for long-term (current) drug use  Z79.899 Lamotrigine (Lamictal), Serum      3. Memory loss  R41.3         Epilepsy with focal seizures and risk of acute repetitive seizures, improving but not to goal.  Last focal unaware seizure 4/24/25.  Increase LTG to 200-250 x 1 week then 250 mg BID, and check level in 4-5 weeks.    Ongoing STM issues.  CTM, improved with LTG and improved with lower seizure frequency.    I advised no driving until 6 months free of major seizures (last one would be 4/24/25).    Return in about 2 months (around 8/23/2025).       ~~~~~~~~~~~~~~~~~~~~~~~~~~~    CHIEF COMPLAINT / REASON FOR VISIT:    Chief Complaint   Patient presents with    Seizures     One episode since visit. Couple focal episodes since last visit. Mother with patient for visits, states she has notices jerking in sleeping, talking in sleeping loudly     HISTORY OBTAINED FROM:  Patient and others as above  Chart review    HISTORY:  Missy Stuart is a 19 year old female with new events of very intense greer vu for the past week.  There is BUE numbness and sweaty, increasing frequency each day (had 7 yesterday).  Today at 1 am, she was with her mom and brother, and had another greer vu feeling, more intense and prolonged.  She had a bad taste in her mouth.  She felt the numbness, was washing her hands and face due to symptoms.  She could not get her words out, and then she began to spin in a [place].  Her mom hugged her, her head jerked upward and she had a generalized convulsion, foaming at the mouth.  EMS was called.  Shaking stopped in 4-5 minutes.  Was taken to ER here and evaluated.    Had been on prozac since May 2024, and remeron since July 2024.  She forgot to take both for 3 days (Fri-Sat-Sun).  On these for anxiety and insomnia.    Had brief 20-30 seconds staring events during  EEG (these have not recurred, and were without EEG correlate).  Off OXC, tiredness is much improved.    Focal aware seizures (irene vu, hot flashes, warm and dizzy x 30-45 seconds; no altered awareness) returned mid Jan 2025, LTG was increased to 150 mg BID.    Also has STM issues ongoing.    Epilepsy risk factors:  Normal birth and development   No febrile or childhood seizures   No meningitis/encephalitis  No head trauma with prolonged LOC/amnesia (did have a concussion at age 6 yrs, no LOC)  No known structural brain lesions  One cousin had a seizure    Previous medication trials:  OXC - tired    INTERIM HISTORY:  Had one 4/24/2025, likely a focal unaware seizure - prolonged event with confusion.  LTG was increased to 200 mg BID.  No major side effects, though remains fatigued.    Also having two more small focal aware seizures since then, once every ~3 weeks (Irene vu, Warm feeling, dissociated feeling without altered awareness).    She is talking and jerking in her sleep, as mom has noticed.  Neither are every night.  STM seems improved on LTG and with less seizures.    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[employer])    DATA REVIEWED:  As documented in the history    March 2025  LTG 8.7    Dec 2024  LTG 5.2  aEEG 46 hr (read by me) - normal, no correlate to 6 events of staring    Aug 2024  EEG normal  8/28/24 CBC, CMP unremarkable (Na 135), OXC level 20 (on 345 mg BID)  CBC, CMP unremarkable  MRI brain unremarkable (arachnoid cyst is not significant)    Head CT unremarkable (I independently analyzed and interpreted this, and I agree with the reading physician's report.)  ER note  CBC WBC 18.5  CMP Na 133, K 3.2, CO2 14.5    PHYSICAL EXAMINATION:  /68   Pulse 84   Resp 16   LMP 07/10/2024 (Exact Date)     Gen: in NAD  MSE: nl attn/conc, nl language, nl fund of knowledge  CN: EOMI, VFF, nl facial mvmt, nl palate, nl tongue  Motor: 5/5  x4  DTR: 2+ BUE and BLE  Coord: nl FTN b/I  Gait: normal    No Known Allergies    Current medications:  Current Outpatient Medications on File Prior to Visit   Medication Sig Dispense Refill    ALPRAZolam 0.25 MG Oral Tab Take 1 tablet (0.25 mg total) by mouth daily as needed.      Midazolam (NAYZILAM) 5 MG/0.1ML Nasal Solution 5 mg by Nasal route as needed (seizure with shaking greater than 5 minutes). 1 each 0    FLUoxetine 20 MG Oral Cap Take 2 capsules (40 mg total) by mouth every morning.       No current facility-administered medications on file prior to visit.          Past Medical History:    Anxiety    Epilepsy (HCC)    Hearing aid worn       Past Surgical History:   Procedure Laterality Date    Knee surgery Left 06/05/2024    ACL       Social History     Socioeconomic History    Marital status: Single   Tobacco Use    Smoking status: Never    Smokeless tobacco: Never   Vaping Use    Vaping status: Never Used   Substance and Sexual Activity    Alcohol use: No    Drug use: No    Sexual activity: Never   Other Topics Concern    Caffeine Concern No     Comment: occasional    Stress Concern No    Special Diet No    Exercise Yes     Comment: 4 per week    Seat Belt Yes       Family History   Problem Relation Age of Onset    Other (Other) Mother         thyroid    Other (RA) Maternal Grandmother     Hypertension Maternal Grandmother     Hypertension Maternal Grandfather     Lipids Maternal Grandfather     Heart Disorder Paternal Grandfather     Lipids Paternal Grandfather          Home Robbins MD, FAES, FAAN  Board-Certified in Neurology, Epilepsy, and Clinical Neurophysiology  [facility] [place]

## 2025-06-23 NOTE — PATIENT INSTRUCTIONS
Instructions from Dr. Robbins:       Increase lamotrigine to 250 mg (two and a half pills) in the morning and 200 mg (two pills) in the evening for 1 week, then 250 mg (two and a half pills) twice a day     Check blood level in 4-5 weeks    ~~~~~~~~~~~~~~~~~~~~~~~  After your visit at the [facility]  today,  please direct any follow up questions or medication needs to the staff in our  [place] office so that your concerns may be promptly addressed.  We are available through [employer] or at the numbers below:    The phone number is:  [phone] option #1    The fax number is:  [phone]    Your pharmacy should also send any requests electronically to the [place] office.    Refill policies:    Allow 2-3 business days for refills; controlled substances may take longer.  Contact your pharmacy at least 5 days prior to running out of medication and have them send an electronic request or submit request through the “request refill” option in your [employer] account.  Refills are not addressed on weekends; covering physicians do not authorize routine medications on weekends.  No narcotics or controlled substances are refilled after noon on Fridays or by on call physicians.  By law, narcotics must be electronically prescribed.  A 30 day supply with no refills is the maximum allowed.  If your prescription is due for a refill, you may be due for a follow up appointment.  To best provide you care, patients receiving routine medications need to be seen at least once a year.  Patients receiving narcotic/controlled substance medications need to be seen at least once every 3 months.  In the event that your preferred pharmacy does not have the requested medication in stock (e.g. Backordered), it is your responsibility to find another pharmacy that has the requested medication available.  We will gladly send a new prescription to that pharmacy at your request.    Scheduling Tests:    If your physician has ordered  radiology tests such as MRI or CT scans, please contact Central Scheduling at [phone] right away to schedule the test.  Once scheduled, the [facility] Centralized Referral Team will work with your insurance carrier to obtain pre-certification or prior authorization.  Depending on your insurance carrier, approval may take 3-10 days.  It is highly recommended patients assure they have received an authorization before having a test performed.  If test is done without insurance authorization, patient may be responsible for the entire amount billed.      Precertification and Prior Authorizations:  If your physician has recommended that you have a procedure or additional testing performed the [facility] Centralized Referral Team will contact your insurance carrier to obtain pre-certification or prior authorization.    You are strongly encouraged to contact your insurance carrier to verify that your procedure/test has been approved and is a COVERED benefit.  Although the [facility] Centralized Referral Team does its due diligence, the insurance carrier gives the disclaimer that \"Although the procedure is authorized, this does not guarantee payment.\"    Ultimately the patient is responsible for payment.   Thank you for your understanding in this matter.  Paperwork Completion:  If you require FMLA or disability paperwork for your recovery, please make sure to either drop it off or have it faxed to our office at [phone]. Be sure the form has your name and date of birth on it.  The form will be faxed to our Forms Department and they will complete it for you.  There is a 25$ fee for all forms that need to be filled out.  Please be aware there is a 10-14 day turnaround time.  You will need to sign a release of information (PHILIPPE) form if your paperwork does not come with one.  You may call the Forms Department with any questions at [phone].  Their fax number is [phone].

## 2025-07-25 DIAGNOSIS — G40.109 FOCAL EPILEPSY (HCC): ICD-10-CM

## 2025-07-25 RX ORDER — ALPRAZOLAM 0.25 MG
0.25 TABLET ORAL DAILY PRN
Refills: 0 | Status: CANCELLED | OUTPATIENT
Start: 2025-07-25

## 2025-07-25 RX ORDER — LAMOTRIGINE 100 MG/1
250 TABLET ORAL 2 TIMES DAILY
Qty: 150 TABLET | Refills: 11 | OUTPATIENT
Start: 2025-07-25 | End: 2026-07-20

## 2025-07-25 NOTE — TELEPHONE ENCOUNTER
Spoke with the pharmacy who states to that they have refills remaining. Will advise the patient.         Medication: lamoTRIgine 100 MG Oral Tab      Date of last refill: 06/23/2025 (#150/11)  [place]     Last office visit: 06/23/2025  Due back to clinic per last office note:  Around 08/23/2025  Date next office visit scheduled:    Future Appointments   Date Time Provider Department Center   8/27/2025  2:00 PM Home Robbins MD [facility]           Last OV note recommendation:    ASSESSMENT & PLAN:         ICD-10-CM     1. Focal epilepsy (HCC)  G40.109 lamoTRIgine 100 MG Oral Tab       2. Encounter for long-term (current) drug use  Z79.899 Lamotrigine (Lamictal), Serum       3. Memory loss  R41.3            Epilepsy with focal seizures and risk of acute repetitive seizures, improving but not to goal.  Last focal unaware seizure 4/24/25.  Increase LTG to 200-250 x 1 week then 250 mg BID, and check level in 4-5 weeks.     Ongoing STM issues.  CTM, improved with LTG and improved with lower seizure frequency.     I advised no driving until 6 months free of major seizures (last one would be 4/24/25).     Return in about 2 months (around 8/23/2025).

## 2025-08-03 ENCOUNTER — LAB ENCOUNTER (OUTPATIENT)
Dept: LAB | Facility: HOSPITAL | Age: 20
End: 2025-08-03
Attending: Other

## 2025-08-04 ENCOUNTER — OFFICE VISIT (OUTPATIENT)
Facility: CLINIC | Age: 20
End: 2025-08-04

## 2025-08-04 VITALS — DIASTOLIC BLOOD PRESSURE: 66 MMHG | RESPIRATION RATE: 16 BRPM | HEART RATE: 88 BPM | SYSTOLIC BLOOD PRESSURE: 112 MMHG

## 2025-08-04 DIAGNOSIS — R41.3 MEMORY LOSS: ICD-10-CM

## 2025-08-04 DIAGNOSIS — G40.109 FOCAL EPILEPSY (HCC): Primary | ICD-10-CM

## 2025-08-04 PROCEDURE — 99214 OFFICE O/P EST MOD 30 MIN: CPT | Performed by: OTHER

## 2025-08-06 ENCOUNTER — RESULTS FOLLOW-UP (OUTPATIENT)
Facility: CLINIC | Age: 20
End: 2025-08-06

## 2025-08-06 DIAGNOSIS — G40.109 FOCAL EPILEPSY (HCC): ICD-10-CM

## 2025-08-07 RX ORDER — LAMOTRIGINE 100 MG/1
TABLET ORAL
Qty: 180 TABLET | Refills: 0 | Status: SHIPPED | OUTPATIENT
Start: 2025-08-07

## 2025-08-10 ENCOUNTER — PATIENT MESSAGE (OUTPATIENT)
Facility: CLINIC | Age: 20
End: 2025-08-10

## 2025-08-18 ENCOUNTER — PATIENT MESSAGE (OUTPATIENT)
Facility: CLINIC | Age: 20
End: 2025-08-18

## (undated) NOTE — LETTER
Garth Duong, [address] [facility]       06/10/20        Patient: Nick Weiss   YOB: 2005   Date of Visit: 6/10/2020       Dear  Dr. Maria Elena Marquez MD,      Thank you for referring Nick Weiss to my pract

## (undated) NOTE — LETTER
[facility] [address] [place] [address]  Dept: [phone]  Dept Fax: [phone]      March 17, 2018    Patient: Jaelyn Treviño   Date of Visit: 3/17/2018       To Whom It May Concern:    Aileen New was seen

## (undated) NOTE — LETTER
24    To Whom it may concern:    This letter is regarding Missy Stuart ( 2005), who is under my care.  They may continue to work and attend school, effective immediately, but for medical reasons, the following restrictions should be in place:  no being on heights or rooftops, no working with hazardous chemicals, no operating heavy machinery or firearms, no driving motor vehicles, no working around open bodies of water, no working around open flames, and no lifting more than 25 lbs.  Reasonable accommodations must be made to meet these restrictions.  These restrictions are in place until 2025.    In addition, until the follow up visit with myself in 6 weeks, she needs to be under close observation at home and cannot fly or attend school in person.    Sincerely,        Home Robbins MD, FAES, FAAN  Board-Certified in Neurology, Epilepsy, and Clinical Neurophysiology  [facility]